# Patient Record
Sex: FEMALE | Race: WHITE | NOT HISPANIC OR LATINO | Employment: OTHER | URBAN - METROPOLITAN AREA
[De-identification: names, ages, dates, MRNs, and addresses within clinical notes are randomized per-mention and may not be internally consistent; named-entity substitution may affect disease eponyms.]

---

## 2017-04-11 ENCOUNTER — OFFICE VISIT (OUTPATIENT)
Dept: FAMILY MEDICINE | Facility: CLINIC | Age: 67
End: 2017-04-11

## 2017-04-11 VITALS
WEIGHT: 173.4 LBS | SYSTOLIC BLOOD PRESSURE: 125 MMHG | OXYGEN SATURATION: 97 % | TEMPERATURE: 98.9 F | HEART RATE: 64 BPM | DIASTOLIC BLOOD PRESSURE: 81 MMHG | RESPIRATION RATE: 20 BRPM | BODY MASS INDEX: 29.53 KG/M2

## 2017-04-11 DIAGNOSIS — M25.551 HIP PAIN, RIGHT: Primary | ICD-10-CM

## 2017-04-11 DIAGNOSIS — B35.3 TINEA PEDIS OF BOTH FEET: ICD-10-CM

## 2017-04-11 RX ORDER — CLOTRIMAZOLE AND BETAMETHASONE DIPROPIONATE 10; .64 MG/G; MG/G
CREAM TOPICAL 2 TIMES DAILY
Qty: 15 G | Refills: 1 | Status: SHIPPED | OUTPATIENT
Start: 2017-04-11 | End: 2017-06-26

## 2017-04-11 ASSESSMENT — ENCOUNTER SYMPTOMS
SLEEP DISTURBANCE: 1
NERVOUS/ANXIOUS: 0
FEVER: 0
CHILLS: 0
SHORTNESS OF BREATH: 0
BACK PAIN: 1
DYSPHORIC MOOD: 0
ARTHRALGIAS: 1
WEAKNESS: 1
HEADACHES: 0

## 2017-04-11 NOTE — PATIENT INSTRUCTIONS
Here is the plan from today's visit    1. Hip pain, right  - X-ray Pelvis w/ unilateral hip right; Future  - [facility], PT, HAND AND CHIROPRACTIC REFERRAL - [facility]    2. Tinea pedis of both feet  - clotrimazole-betamethasone (LOTRISONE) cream; Apply topically 2 times daily  Dispense: 15 g; Refill: 1    Please call or return to clinic if your symptoms don't go away.    Follow up plan  Follow up if you are not improving in the next 2  weeks.    Thank you for coming to [facility] today.  Lab Testing:  **If you had lab testing today and your results are reassuring or normal they will be mailed to you or sent through [employer] within 7 days.   **If the lab tests need quick action we will call you with the results.  The phone number we will call with results is # [phone] (home) . If this is not the best number please call our clinic and change the number.  Medication Refills:  If you need any refills please call your pharmacy and they will contact us.   If you need to  your refill at a new pharmacy, please contact the new pharmacy directly. The new pharmacy will help you get your medications transferred faster.   Scheduling:  If you have any concerns about today's visit or wish to schedule another appointment please call our office during normal business hours [phone] (8-5:00 M-F)  If a referral was made to a [facility] and you don't get a call from central scheduling please call [phone].  If a Mammogram was ordered for you at [facility] call [phone] to schedule or change your appointment.  If you had an XRay/CT/Ultrasound/MRI ordered the number is [phone] to schedule or change your radiology appointment.   Medical Concerns:  If you have urgent medical concerns please call [phone] at any time of the day.

## 2017-04-11 NOTE — PROGRESS NOTES
HPI:       Kimberly Laguna is a 66 year old who presents for the following  Patient presents with:  Musculoskeletal Problem: x2 wks, right  Fungal Infection: bilateral    Joint Pain     Onset: 2 weeks ago started with cold symptoms and coughing    Injury  no    Description:   Location(s): posterior R hip pain  Character: Stabbing and Burning    Intensity: 8/10    Progression of Symptoms: same    Accompanying Signs & Symptoms:  Other symptoms: weakness of hip   History:   Previous similar pain: Yes Details: scitatic pain in the past but different    Worsened by    Overuse?: Yes worse with walking  Morning Stiffness?:Yes     Alleviating factors:  Improved by: naproxen, oxycodone, sitting       Therapies Tried and outcome: naproxen, biking Details: helping  -Improved with sitting and worse with sleeping, worse with walking too, smokes marijuana during the day with improvement in pain  -Rash on bilateral feet, worse on left bottom of foot, has previously tried miconazole and hydrocortisone with no improvement    Problem, Medication and Allergy Lists were reviewed and are  current.  Patient is an established patient of this clinic.         Review of Systems:   Review of Systems   Constitutional: Negative for chills and fever.   Respiratory: Negative for shortness of breath.    Cardiovascular: Negative for chest pain.   Musculoskeletal: Positive for arthralgias (L hip pain), back pain and gait problem.   Neurological: Positive for weakness (weakness while walking). Negative for headaches.   Psychiatric/Behavioral: Positive for sleep disturbance. Negative for dysphoric mood. The patient is not nervous/anxious.              Physical Exam:   Patient Vitals for the past 24 hrs:   BP Temp Temp src Pulse Resp SpO2 Weight   04/11/17 1502 125/81 - - 64 - - -   04/11/17 1456 144/85 98.9  F (37.2  C) Oral 90 20 97 % 173 lb 6.4 oz (78.7 kg)     Body mass index is 29.53 kg/(m^2).  Vitals were reviewed and were normal with  repeat BP     Physical Exam   Constitutional: She appears well-developed and well-nourished. No distress.   HENT:   Head: Normocephalic.   Eyes: Conjunctivae are normal. No scleral icterus.   Cardiovascular: Normal rate, regular rhythm and normal heart sounds.    Pulmonary/Chest: Effort normal and breath sounds normal. She has no wheezes.   Abdominal: Soft. Bowel sounds are normal. She exhibits no distension. There is no tenderness.   Musculoskeletal: Normal range of motion. She exhibits no edema or tenderness.        Cervical back: She exhibits no tenderness and no bony tenderness.        Thoracic back: She exhibits no tenderness and no bony tenderness.        Lumbar back: She exhibits no tenderness, no bony tenderness and no pain.        Back:    Negative FABERE, FAIR (piriformis test) and straight leg raise test, stability with bilateral single leg squat    Neurological: She has normal strength and normal reflexes. No sensory deficit. Gait normal.   Skin:   Bilateral feet with erythematous scaly rash between toes and bottom of left foot, no edema   Psychiatric: Her mood appears anxious. Her speech is rapid and/or pressured. She is hyperactive.         Results:   XR Pelvis w/ unilateral right hip:  IMPRESSION:  1. Joint space narrowing in both hip joints, otherwise no acute bone  abnormality noted.  2. Degenerative disc disease in the visualized lower lumbar spine.    Assessment and Plan     Kimberly was seen today for musculoskeletal problem, fungal infection and depression.    Diagnoses and all orders for this visit:    Hip pain, right  Differential diagnosis includes Muscle strain vs. Piriformis syndrome vs. Osteoarthritis vs. Psychiatric. Physical exam is reassuring with normal strength and reflexes. No tenderness to palpation. X-ray shows mild arthritis with joint space narrowing in both hips. Trial of physical therapy, apply heat packs and may take tylenol or ibuprofen for pain relief. Follow up if symptoms do  not improve.   -     X-ray Pelvis w/ unilateral hip right; Future  -     [facility], PT, HAND AND CHIROPRACTIC REFERRAL - [facility]    Tinea pedis of both feet  Try clotrimazole cream BID for 2 weeks. Follow up if no improvement.   -     clotrimazole (LOTRIMIN) 1 % cream; Apply topically 2 times daily      There are no discontinued medications.  Options for treatment and follow-up care were reviewed with the patient. Kimberly Laguna  engaged in the decision making process and verbalized understanding of the options discussed and agreed with the final plan.    Kristi Osuna MD

## 2017-04-11 NOTE — MR AVS SNAPSHOT
After Visit Summary   4/11/2017    Kimberly Laguna    MRN: [id number]           Patient Information     Date Of Birth          1950        Visit Information        Provider Department      4/11/2017 2:40 PM Kristi Osuna MD [facility]        Today's Diagnoses     Hip pain, right    -  1    Tinea pedis of both feet          Care Instructions    Here is the plan from today's visit    1. Hip pain, right  - X-ray Pelvis w/ unilateral hip right; Future  - [facility], PT, HAND AND CHIROPRACTIC REFERRAL - [facility]    2. Tinea pedis of both feet  - clotrimazole-betamethasone (LOTRISONE) cream; Apply topically 2 times daily  Dispense: 15 g; Refill: 1    Please call or return to clinic if your symptoms don't go away.    Follow up plan  Follow up if you are not improving in the next 2  weeks.    Thank you for coming to [facility] today.  Lab Testing:  **If you had lab testing today and your results are reassuring or normal they will be mailed to you or sent through [employer] within 7 days.   **If the lab tests need quick action we will call you with the results.  The phone number we will call with results is # [phone] (home) . If this is not the best number please call our clinic and change the number.  Medication Refills:  If you need any refills please call your pharmacy and they will contact us.   If you need to  your refill at a new pharmacy, please contact the new pharmacy directly. The new pharmacy will help you get your medications transferred faster.   Scheduling:  If you have any concerns about today's visit or wish to schedule another appointment please call our office during normal business hours [phone] (8-5:00 M-F)  If a referral was made to a [facility] and you don't get a call from central scheduling please call [phone].  If a Mammogram was ordered for you at [facility] call [phone] to schedule or change your  appointment.  If you had an XRay/CT/Ultrasound/MRI ordered the number is [phone] to schedule or change your radiology appointment.   Medical Concerns:  If you have urgent medical concerns please call [phone] at any time of the day.          Follow-ups after your visit        Additional Services     [facility], PT, HAND AND CHIROPRACTIC REFERRAL - [facility]       **This order will print in the [facility] Scheduling Office**    Physical Therapy, Hand Therapy and Chiropractic Care are available through:    *[facility]  *[facility]  *[facility]    Call one number to schedule at any of the above locations: [phone].    Your provider has referred you to: Physical Therapy at [facility] or [facility]    Indication/Reason for Referral: right hip pain  Onset of Illness: 2 weeks ago  Therapy Orders: Evaluate and Treat  Special Programs: None  Special Request: None    Aundrea Guillaume      Additional Comments for the Therapist or Chiropractor:       Please be aware that coverage of these services is subject to the terms and limitations of your health insurance plan.  Call member services at your health plan with any benefit or coverage questions.      Please bring the following to your appointment:    *Your personal calendar for scheduling future appointments  *Comfortable clothing                  Who to contact     Please call your clinic at [phone] to:    Ask questions about your health    Make or cancel appointments    Discuss your medicines    Learn about your test results    Speak to your doctor   If you have compliments or concerns about an experience at your clinic, or if you wish to file a complaint, please contact [facility] Patient Relations at [phone] or email us at [email or web address]         Additional Information About Your Visit        [employer] Information     [employer] is an electronic gateway that provides easy, online access to  your medical records. With [employer], you can request a clinic appointment, read your test results, renew a prescription or communicate with your care team.     To sign up for [employer] visit the website at [email or web address]   You will be asked to enter the access code listed below, as well as some personal information. Please follow the directions to create your username and password.     Your access code is: [email or web address]  Expires: 7/10/2017  4:37 PM     Your access code will  in 90 days. If you need help or a new code, please contact your [facility] or call [phone] for assistance.        Care EveryWhere ID     This is your Care EveryWhere ID. This could be used by other organizations to access your [facility] medical records  [id number]        Your Vitals Were     Pulse Temperature Respirations Pulse Oximetry BMI (Body Mass Index)       64 98.9  F (37.2  C) (Oral) 20 97% 29.53 kg/m2        Blood Pressure from Last 3 Encounters:   17 125/81   16 121/80   08/10/15 139/72    Weight from Last 3 Encounters:   17 173 lb 6.4 oz (78.7 kg)   16 169 lb 3.2 oz (76.7 kg)   08/10/15 163 lb (73.9 kg)              We Performed the Following     [facility], PT, HAND AND CHIROPRACTIC REFERRAL - [facility]          Today's Medication Changes          These changes are accurate as of: 17  4:37 PM.  If you have any questions, ask your nurse or doctor.               Start taking these medicines.        Dose/Directions    clotrimazole-betamethasone cream   Commonly known as:  LOTRISONE   Used for:  Tinea pedis of both feet   Started by:  Kristi Osuna MD        Apply topically 2 times daily   Quantity:  15 g   Refills:  1            Where to get your medicines      These medications were sent to [facility] [address] [place], [place] - [address] [id number]     Phone:  [phone]     clotrimazole-betamethasone cream                 Primary Care Provider Office Phone # Fax #    Gautam Malcolm [phone]       [facility] [address] [facility] [address]        Thank you!     Thank you for choosing [facility]  for your care. Our goal is always to provide you with excellent care. Hearing back from our patients is one way we can continue to improve our services. Please take a few minutes to complete the written survey that you may receive in the mail after your visit with us. Thank you!             Your Updated Medication List - Protect others around you: Learn how to safely use, store and throw away your medicines at www.disposemymeds.org.          This list is accurate as of: 4/11/17  4:37 PM.  Always use your most recent med list.                   Brand Name Dispense Instructions for use    acyclovir 400 MG tablet    ZOVIRAX    15 tablet    Take 1 tablet (400 mg) by mouth 3 times daily       clotrimazole-betamethasone cream    LOTRISONE    15 g    Apply topically 2 times daily       fexofenadine 180 MG tablet    ALLEGRA ALLERGY    30 tablet    Take 1 tablet (180 mg) by mouth daily       fluocinonide 0.05 % ointment    LIDEX    60 g    Apply to hands twice a day for 14 days at a time       guaiFENesin-dextromethorphan 100-10 MG/5ML syrup    ROBITUSSIN DM    560 mL    Take 5 mLs by mouth every 4 hours as needed for cough       hydrocortisone 2.5 % ointment     30 g    Apply  topically 2 times daily.       hydrOXYzine 25 MG tablet    ATARAX    30 tablet    Take 1-2 tablets by mouth nightly as needed for itching.       oxyCODONE 10 MG IR tablet    ROXICODONE     Take by mouth every 4 hours as needed       SEROQUEL 25 MG tablet   Generic drug:  QUEtiapine      Take 50 mg by mouth At Bedtime.       triamcinolone 0.1 % ointment    KENALOG    120 g    Apply topically 2 times daily Apply this to arms but not on hands.

## 2017-04-12 ENCOUNTER — THERAPY VISIT (OUTPATIENT)
Dept: PHYSICAL THERAPY | Facility: CLINIC | Age: 67
End: 2017-04-12
Payer: MEDICARE

## 2017-04-12 ENCOUNTER — TELEPHONE (OUTPATIENT)
Dept: FAMILY MEDICINE | Facility: CLINIC | Age: 67
End: 2017-04-12

## 2017-04-12 DIAGNOSIS — M25.551 HIP PAIN, RIGHT: ICD-10-CM

## 2017-04-12 DIAGNOSIS — M54.16 LUMBAR RADICULOPATHY: ICD-10-CM

## 2017-04-12 PROCEDURE — 97161 PT EVAL LOW COMPLEX 20 MIN: CPT | Mod: GP | Performed by: PHYSICAL THERAPIST

## 2017-04-12 PROCEDURE — 97110 THERAPEUTIC EXERCISES: CPT | Mod: GP | Performed by: PHYSICAL THERAPIST

## 2017-04-12 PROCEDURE — G8979 MOBILITY GOAL STATUS: HCPCS | Mod: GP | Performed by: PHYSICAL THERAPIST

## 2017-04-12 PROCEDURE — G8978 MOBILITY CURRENT STATUS: HCPCS | Mod: GP | Performed by: PHYSICAL THERAPIST

## 2017-04-12 RX ORDER — CLOTRIMAZOLE 1 %
CREAM (GRAM) TOPICAL 2 TIMES DAILY
Qty: 15 G | Refills: 1 | Status: SHIPPED | OUTPATIENT
Start: 2017-04-12 | End: 2017-06-26

## 2017-04-12 NOTE — TELEPHONE ENCOUNTER
[facility] phone call message- medication clarification/question:    Full Medication Name: clotrimazole-betamethasone (LOTRISONE) cream 15 g   Dose: Apply topically 2 times daily    Question: Rakesh calling from [facility] pharmacy.  He states patients insurance does not cover this drug, it is un-formulary.  He states if the two medications are prescribed separately they would be covered.  Does the doctor want to send a script for each medication separately, one for clotrimazole and one for betamethasone.  Please call to advise.         Pharmacy confirmed as      [facility] [address] [place] [address]: Yes    OK to leave a message on voice mail? Yes    Primary language: English      needed? No    Call taken on April 12, 2017 at 9:48 AM by Iwona Lowery

## 2017-04-12 NOTE — MR AVS SNAPSHOT
After Visit Summary   4/12/2017    Kimberly Laguna    MRN: [id number]           Patient Information     Date Of Birth          1950        Visit Information        Provider Department      4/12/2017 4:40 PM Yulia Oseguera, PT [facility]        Today's Diagnoses     Hip pain, right        Lumbar radiculopathy           Follow-ups after your visit        Your next 10 appointments already scheduled     Apr 14, 2017  2:00 PM CDT   TATY Extremity with Jamshid Langford PT   [facility] ([facility]  )    [address] [place] [id number]   [phone]            Apr 26, 2017  3:20 PM CDT   TATY Extremity with Yulia Oseguera PT   [facility] ([facility]  )    [address] [place] [id number]   [phone]            Apr 28, 2017  4:10 PM CDT   TATY Extremity with Yulia Oseguera PT   [facility] ([facility]  )    [address] [place] [id number]   [phone]              Who to contact     If you have questions or need follow up information about today's clinic visit or your schedule please contact [facility] directly at [phone].  Normal or non-critical lab and imaging results will be communicated to you by MyChart, letter or phone within 4 business days after the clinic has received the results. If you do not hear from us within 7 days, please contact the clinic through MyChart or phone. If you have a critical or abnormal lab result, we will notify you by phone as soon as possible.  Submit refill requests through [employer] or call your pharmacy and they will forward the refill request to us. Please allow 3 business days for your refill to be completed.          Additional Information About Your Visit    "     MyChart Information     [employer] lets you send messages to your doctor, view your test results, renew your prescriptions, schedule appointments and more. To sign up, go to [email or web address] . Click on \"Log in\" on the left side of the screen, which will take you to the Welcome page. Then click on \"Sign up Now\" on the right side of the page.     You will be asked to enter the access code listed below, as well as some personal information. Please follow the directions to create your username and password.     Your access code is: [email or web address]  Expires: 7/10/2017  4:37 PM     Your access code will  in 90 days. If you need help or a new code, please call your [facility] clinic or [phone].        Care EveryWhere ID     This is your Care EveryWhere ID. This could be used by other organizations to access your [facility] medical records  [id number]         Blood Pressure from Last 3 Encounters:   17 125/81   16 121/80   08/10/15 139/72    Weight from Last 3 Encounters:   17 78.7 kg (173 lb 6.4 oz)   16 76.7 kg (169 lb 3.2 oz)   08/10/15 73.9 kg (163 lb)              We Performed the Following     HC PT EVAL, LOW COMPLEXITY     TATY CERT REPORT     THERAPEUTIC EXERCISES        Primary Care Provider Office Phone # Fax #    Gautam Malcolm,  [phone]       [facility] [address] [facility] [address]        Thank you!     Thank you for choosing [facility]  for your care. Our goal is always to provide you with excellent care. Hearing back from our patients is one way we can continue to improve our services. Please take a few minutes to complete the written survey that you may receive in the mail after your visit with us. Thank you!             Your Updated Medication List - Protect others around you: Learn how to safely use, store and throw away your medicines at www.disposemymeds.org.    "       This list is accurate as of: 4/12/17 11:59 PM.  Always use your most recent med list.                   Brand Name Dispense Instructions for use    acyclovir 400 MG tablet    ZOVIRAX    15 tablet    Take 1 tablet (400 mg) by mouth 3 times daily       clotrimazole 1 % cream    LOTRIMIN    15 g    Apply topically 2 times daily       clotrimazole-betamethasone cream    LOTRISONE    15 g    Apply topically 2 times daily       fexofenadine 180 MG tablet    ALLEGRA ALLERGY    30 tablet    Take 1 tablet (180 mg) by mouth daily       fluocinonide 0.05 % ointment    LIDEX    60 g    Apply to hands twice a day for 14 days at a time       guaiFENesin-dextromethorphan 100-10 MG/5ML syrup    ROBITUSSIN DM    560 mL    Take 5 mLs by mouth every 4 hours as needed for cough       hydrocortisone 2.5 % ointment     30 g    Apply  topically 2 times daily.       hydrOXYzine 25 MG tablet    ATARAX    30 tablet    Take 1-2 tablets by mouth nightly as needed for itching.       oxyCODONE 10 MG IR tablet    ROXICODONE     Take by mouth every 4 hours as needed       SEROQUEL 25 MG tablet   Generic drug:  QUEtiapine      Take 50 mg by mouth At Bedtime.       triamcinolone 0.1 % ointment    KENALOG    120 g    Apply topically 2 times daily Apply this to arms but not on hands.

## 2017-04-12 NOTE — LETTER
DEPARTMENT OF HEALTH AND HUMAN SERVICES  CENTERS FOR MEDICARE & MEDICAID SERVICES    PLAN/UPDATED PLAN OF PROGRESS FOR OUTPATIENT REHABILITATION    PATIENTS NAME:  Kimberly Laguna     : 1950    [facility] NUMBER:    [id number]    [facility]    PR[facility] NAME: [facility]AP[facility]    MEDICAL RECORD NUMBER: [id number]     START OF CARE DATE:  SOC Date: 17   TYPE:  PT    PRIMARY/TREATMENT DIAGNOSIS: (Pertinent Medical Diagnosis)     Hip pain, right  Lumbar radiculopathy    VISITS FROM START OF CARE:  Rxs Used: 1     Subjective:  Kimberly Laguna is a 66 year old female with a right hip condition. Condition occurred with: Insidious onset. Condition occurred: for unknown reasons. This is a new condition 17 referral.  Pt thinks her pain began from sitting too much during the winter, gaining weight.  First noticed the pain 2-3 weeks ago when she went from a 1/2 mile walk.  Pt reports hx of sciatica R.  Patient reports pain: posterior. Radiates to: Low back and thigh (also has bruised feeling on lateral thigh). Pain is described as shooting and is constant and reported as 2/10 currently, 8/10 at worst. Associated with: Numbness, tingling and loss of strength (popping/clicking). Pain is the same all the time. Symptoms are exacerbated by standing and walking and relieved by UE support - feels better with cart in store, bike riding. Since onset symptoms have remained unchanged.  No previous treatment. General health as reported by patient is fair. Pertinent medical history includes: borderline personality disorder, hepatitis C, marijuana use, weakness. Medical allergies: none. Other surgeries include: none. Current medications: sleep. Current occupation is retired.  Primary job tasks include: household tasks, gardening.   Barriers include: none Red flags: pain at night/rest.     Objective:  Standing Alignment:    Lumbar:  Lordosis decr  Hip deviations  alignment: B femoral medial rotation.  General Deviations:  Toe out R  Functional/Special Tests:  Forward bend: no change in sxs  Back bend: improved sxs  L trunk rotation: improved sxs  R trunk rotation: increased sxs  L trunk lateral flexion: no change in sxs  R trunk lateral flexion: increased sxs, improved with manual block of lateral translation  L single leg stance: no change ins xs  R single leg stance: increased sxs, Trendelenberg, impaired balance compared to L  DOROTHY and FADIR L negative  DOROTHY R negative  FADIR R:  tightness  otherwise pain free  Prone active knee flexion: B rotates and extends lumbar spine, no increase in sxs  Sensation:  Light touch intact and symmetrical B LEs  Strength:  Hip flexion: R 4/5 limited by pain in R glut, L 5/5  Knee extension: B 5/5 pain free  Dorsiflexion: B 5/5 pain free  Glut med: L 4/5, R 3/5 with pain  Glut max: R 4/5 pain free, rotates in lumbar spine, L 5/5  Hamstrings: B pain in low back and R glut, 4/5 R, 5/5 L  ROM:  B hip and trunk ROM WNL  Palpation:  Not assessed today  Edema:  None noted  Gait:  L trunk lean, B Trendelenberg, B foot abduction    Assessment/Plan:    Patient is a 66 year old female with right side hip complaints.  Pt presents with signs and symptoms consistent with lumbar radiculopathy at this time.  Patient has the following significant findings with corresponding treatment plan.                Diagnosis 1:  R hip pain/lumbar radiculopathy  Pain -  hot/cold therapy, manual therapy, education and home program  Decreased strength - therapeutic exercise, therapeutic activities and home program  Impaired balance - neuro re-education, therapeutic activities and home program  Impaired gait - gait training and home program  Impaired muscle performance - neuro re-education and home program  Decreased function - therapeutic activities and home program  Impaired posture - neuro re-education, therapeutic activities and home program  Therapy Evaluation  Codes:   1) History comprised of:   Personal factors that impact the plan of care:      Age and Overall behavior pattern.    Comorbidity factors that impact the plan of care are:      Mental illness, Pain at night/rest, Weakness and hepatitis c.     Medications impacting care: Sleep.  2) Examination of Body Systems comprised of:   Body structures and functions that impact the plan of care:      Hip and Lumbar spine.   Activity limitations that impact the plan of care are:      Stairs, Standing and Walking.  3) Clinical presentation characteristics are:   Stable/Uncomplicated.  4) Decision-Making    Low complexity using standardized patient assessment instrument and/or measureable assessment of functional outcome.  Cumulative Therapy Evaluation is: Low complexity.  Previous and current functional limitations:  (See Goal Flow Sheet for this information)    Short term and Long term goals: (See Goal Flow Sheet for this information)   Communication ability:  Patient appears to be able to clearly communicate and understand verbal and written communication and follow directions correctly.  Treatment Explanation - The following has been discussed with the patient:   RX ordered/plan of care  Anticipated outcomes  Possible risks and side effects  This patient would benefit from PT intervention to resume normal activities.   Rehab potential is good.  Frequency:  1 X week, once daily  Duration:  for 6 weeks  Discharge Plan:  Achieve all LTG.  Independent in home treatment program.  Reach maximal therapeutic benefit.        Caregiver Signature/Credentials _____________________________ Date ________       Treating Provider: Yulia Oseguera, PT, DPT   I have reviewed and certified the need for these services and plan of treatment while under my care.        PHYSICIAN'S SIGNATURE:   _________________________________________  Date___________   Tiffany Fuentes*    Certification period:  Beginning of Cert date period: 04/12/17 to   "End of Cert period date: 07/10/17     Functional Level Progress Report: Please see attached \"Goal Flow sheet for Functional level.\"    ____X____ Continue Services or       ________ DC Services                Service dates: From  SOC Date: 04/12/17 date to present                         "

## 2017-04-12 NOTE — PROGRESS NOTES
Subjective:    Patient is a 66 year old female presenting with rehab composite hpi and rehab right hip hpi.   Additional Answers from Therapist interview  Kimberly Laguna is a 66 year old female with a right hip condition.  Condition occurred with:  Insidious onset.     Pt thinks her pain began from sitting too much during the winter, gaining weight.  First noticed the pain when she went from a 1/2 mile walk.  Got a cold, then noticed that the pain remained. .  Patient reports pain:  Posterior.  Radiates to:  Low back and thigh (also has bruised feeling on lateral thigh).  Associated symptoms:  Numbness, tingling and loss of strength (popping/clicking).  Symptoms are exacerbated by standing and walking  Relieved by: UE support - feel better with cart in store, bike riding.                        Objective:    Standing Alignment:        Lumbar:  Lordosis decr    Hip deviations alignment: B femoral medial rotation.      General Deviations:  Toe out R            Physical Exam     Functional Tests:  Forward bend: no change  Back bend: improved sxs  L trunk rotation: improved sxs  R trunk rotation: increased sxs  L trunk lateral flexion: ***  R trunk lateral flexion: increased sxs, improved with manual blocks  L single leg stance: no change  R single leg stance: increased sxs, Trendelenberg, impaired balance compared to L  Partial squat: ***   FDIR R tightness otherwise painfree good ROM    Sensation:  Light touch intact and symmetrical B LEs    Strength:  ***   Hip flexion: R 4/5 limited by pain in R glut  Knee extension: B 5/5 pain free  Dorsiflexion: B 5/5 pain free  Glut med: L 4/5  Glut max: R 4/5 pain free, rotates in lumbar spine  Hamstrings: B pain in low back and R glut, 4/5 R, 5/5 L, lubmar ext  And rotation    Shoulder flexion:  Shoulder abduction:  Shoulder IR:  Shoulder ER:  Elbow flexion:  :    ROM:  Motion L R Comments                                               Relative  Flexibility:  ***    Palpation:  ***    Edema:  ***    Gait:  ***        General     ROS    Assessment/Plan:      Patient is a 66 year old female with right side hip complaints.  Pt presents with signs and symptoms consistent with lumbar radiculopathy at this time.  Patient has the following significant findings with corresponding treatment plan.                Diagnosis 1:  R hip pain/lumbar radiculopathy  Pain -  hot/cold therapy, manual therapy, education and home program  Decreased strength - therapeutic exercise, therapeutic activities and home program  Impaired balance - neuro re-education, therapeutic activities and home program  Impaired gait - gait training and home program  Impaired muscle performance - neuro re-education and home program  Decreased function - therapeutic activities and home program  Impaired posture - neuro re-education, therapeutic activities and home program    Therapy Evaluation Codes:   1) History comprised of:   Personal factors that impact the plan of care:      Age and Overall behavior pattern.    Comorbidity factors that impact the plan of care are:      Mental illness, Pain at night/rest, Weakness and hepatitis c.     Medications impacting care: Sleep.  2) Examination of Body Systems comprised of:   Body structures and functions that impact the plan of care:      Hip and Lumbar spine.   Activity limitations that impact the plan of care are:      Stairs, Standing and Walking.  3) Clinical presentation characteristics are:   Stable/Uncomplicated.  4) Decision-Making    Low complexity using standardized patient assessment instrument and/or measureable assessment of functional outcome.  Cumulative Therapy Evaluation is: Low complexity.    Previous and current functional limitations:  (See Goal Flow Sheet for this information)    Short term and Long term goals: (See Goal Flow Sheet for this information)     Communication ability:  Patient appears to be able to clearly communicate and  understand verbal and written communication and follow directions correctly.  Treatment Explanation - The following has been discussed with the patient:   RX ordered/plan of care  Anticipated outcomes  Possible risks and side effects  This patient would benefit from PT intervention to resume normal activities.   Rehab potential is good.    Frequency:  1 X week, once daily  Duration:  for 6 weeks  Discharge Plan:  Achieve all LTG.  Independent in home treatment program.  Reach maximal therapeutic benefit.    Please refer to the daily flowsheet for treatment today, total treatment time and time spent performing 1:1 timed codes.

## 2017-04-13 PROBLEM — M25.551 HIP PAIN, RIGHT: Status: ACTIVE | Noted: 2017-04-13

## 2017-04-13 PROBLEM — M54.16 LUMBAR RADICULOPATHY: Status: ACTIVE | Noted: 2017-04-13

## 2017-04-13 ASSESSMENT — ACTIVITIES OF DAILY LIVING (ADL)
LIGHT_TO_MODERATE_WORK: SLIGHT DIFFICULTY
HOS_ADL_ITEM_SCORE_TOTAL: 18
WALKING_DOWN_STEEP_HILLS: UNABLE TO DO
STANDING_FOR_15_MINUTES: UNABLE TO DO
WALKING_UP_STEEP_HILLS: UNABLE TO DO
HOW_WOULD_YOU_RATE_YOUR_CURRENT_LEVEL_OF_FUNCTION_DURING_YOUR_USUAL_ACTIVITIES_OF_DAILY_LIVING_FROM_0_TO_100_WITH_100_BEING_YOUR_LEVEL_OF_FUNCTION_PRIOR_TO_YOUR_HIP_PROBLEM_AND_0_BEING_THE_INABILITY_TO_PERFORM_ANY_OF_YOUR_USUAL_DAILY_ACTIVITIES?: 20
WALKING_INITIALLY: MODERATE DIFFICULTY
STEPPING_UP_AND_DOWN_CURBS: MODERATE DIFFICULTY
ROLLING_OVER_IN_BED: SLIGHT DIFFICULTY
HOS_ADL_HIGHEST_POTENTIAL_SCORE: 60
SITTING_FOR_15_MINUTES: NO DIFFICULTY AT ALL
GETTING_INTO_AND_OUT_OF_A_BATHTUB: EXTREME DIFFICULTY
TWISTING/PIVOTING_ON_INVOLVED_LEG: MODERATE DIFFICULTY
WALKING_15_MINUTES_OR_GREATER: UNABLE TO DO
WALKING_APPROXIMATELY_10_MINUTES: UNABLE TO DO
PUTTING_ON_SOCKS_AND_SHOES: SLIGHT DIFFICULTY
GETTING_INTO_AND_OUT_OF_AN_AVERAGE_CAR: MODERATE DIFFICULTY
HEAVY_WORK: UNABLE TO DO
DEEP_SQUATTING: UNABLE TO DO
HOS_ADL_COUNT: 15
RECREATIONAL_ACTIVITIES: SLIGHT DIFFICULTY
HOS_ADL_SCORE(%): 30

## 2017-04-13 NOTE — PATIENT INSTRUCTIONS
I did not prescribe that medication.  Will defer to Kristi Osuna who prescribed the medication.    Nichole Malcolm, DO

## 2017-04-13 NOTE — PROGRESS NOTES
Subjective:     Kimberly Laguna is a 66 year old female with a right hip condition. Condition occurred with: Insidious onset. Condition occurred: for unknown reasons. This is a new condition 4/11/17 referral.  Pt thinks her pain began from sitting too much during the winter, gaining weight.  First noticed the pain 2-3 weeks ago when she went from a 1/2 mile walk.  Pt reports hx of sciatica R.    Patient reports pain: posterior. Radiates to: Low back and thigh (also has bruised feeling on lateral thigh). Pain is described as shooting and is constant and reported as 2/10 currently, 8/10 at worst. Associated with: Numbness, tingling and loss of strength (popping/clicking). Pain is the same all the time. Symptoms are exacerbated by standing and walking and relieved by UE support - feels better with cart in store, bike riding. Since onset symptoms have remained unchanged.  No previous treatment. General health as reported by patient is fair. Pertinent medical history includes: borderline personality disorder, hepatitis C, marijuana use, weakness. Medical allergies: none. Other surgeries include: none. Current medications: sleep. Current occupation is retired.  Primary job tasks include: household tasks, gardening.     Barriers include: none     Red flags: pain at night/rest.     Objective:    Standing Alignment:    Lumbar:  Lordosis decr  Hip deviations alignment: B femoral medial rotation.  General Deviations:  Toe out R    Functional/Special Tests:  Forward bend: no change in sxs  Back bend: improved sxs  L trunk rotation: improved sxs  R trunk rotation: increased sxs  L trunk lateral flexion: no change in sxs  R trunk lateral flexion: increased sxs, improved with manual block of lateral translation  L single leg stance: no change ins xs  R single leg stance: increased sxs, Trendelenberg, impaired balance compared to L  DOROTHY and FADIR L negative  DOROTHY R negative  FADIR R:  tightness  otherwise pain free  Prone  active knee flexion: B rotates and extends lumbar spine, no increase in sxs    Sensation:  Light touch intact and symmetrical B LEs    Strength:  Hip flexion: R 4/5 limited by pain in R glut, L 5/5  Knee extension: B 5/5 pain free  Dorsiflexion: B 5/5 pain free  Glut med: L 4/5, R 3/5 with pain  Glut max: R 4/5 pain free, rotates in lumbar spine, L 5/5  Hamstrings: B pain in low back and R glut, 4/5 R, 5/5 L    ROM:  B hip and trunk ROM WNL    Palpation:  Not assessed today    Edema:  None noted    Gait:  L trunk lean, B Trendelenberg, B foot abduction    Assessment/Plan:      Patient is a 66 year old female with right side hip complaints.  Pt presents with signs and symptoms consistent with lumbar radiculopathy at this time.  Patient has the following significant findings with corresponding treatment plan.                Diagnosis 1:  R hip pain/lumbar radiculopathy  Pain -  hot/cold therapy, manual therapy, education and home program  Decreased strength - therapeutic exercise, therapeutic activities and home program  Impaired balance - neuro re-education, therapeutic activities and home program  Impaired gait - gait training and home program  Impaired muscle performance - neuro re-education and home program  Decreased function - therapeutic activities and home program  Impaired posture - neuro re-education, therapeutic activities and home program    Therapy Evaluation Codes:   1) History comprised of:   Personal factors that impact the plan of care:      Age and Overall behavior pattern.    Comorbidity factors that impact the plan of care are:      Mental illness, Pain at night/rest, Weakness and hepatitis c.     Medications impacting care: Sleep.  2) Examination of Body Systems comprised of:   Body structures and functions that impact the plan of care:      Hip and Lumbar spine.   Activity limitations that impact the plan of care are:      Stairs, Standing and Walking.  3) Clinical presentation characteristics  are:   Stable/Uncomplicated.  4) Decision-Making    Low complexity using standardized patient assessment instrument and/or measureable assessment of functional outcome.  Cumulative Therapy Evaluation is: Low complexity.    Previous and current functional limitations:  (See Goal Flow Sheet for this information)    Short term and Long term goals: (See Goal Flow Sheet for this information)     Communication ability:  Patient appears to be able to clearly communicate and understand verbal and written communication and follow directions correctly.  Treatment Explanation - The following has been discussed with the patient:   RX ordered/plan of care  Anticipated outcomes  Possible risks and side effects  This patient would benefit from PT intervention to resume normal activities.   Rehab potential is good.    Frequency:  1 X week, once daily  Duration:  for 6 weeks  Discharge Plan:  Achieve all LTG.  Independent in home treatment program.  Reach maximal therapeutic benefit.    Please refer to the daily flowsheet for treatment today, total treatment time and time spent performing 1:1 timed codes.

## 2017-04-14 ENCOUNTER — THERAPY VISIT (OUTPATIENT)
Dept: PHYSICAL THERAPY | Facility: CLINIC | Age: 67
End: 2017-04-14
Payer: MEDICARE

## 2017-04-14 DIAGNOSIS — R26.9 ABNORMAL GAIT: Primary | ICD-10-CM

## 2017-04-14 DIAGNOSIS — M54.16 LUMBAR RADICULOPATHY: ICD-10-CM

## 2017-04-14 DIAGNOSIS — M25.551 HIP PAIN, RIGHT: ICD-10-CM

## 2017-04-14 PROCEDURE — 97116 GAIT TRAINING THERAPY: CPT | Mod: GP | Performed by: PHYSICAL THERAPIST

## 2017-04-14 PROCEDURE — 97110 THERAPEUTIC EXERCISES: CPT | Mod: GP | Performed by: PHYSICAL THERAPIST

## 2017-04-14 NOTE — PROGRESS NOTES
Preceptor Attestation:   Patient seen and discussed with the resident. Assessment and plan reviewed with resident and agreed upon.   Supervising Physician:  Tiffany Sheikh MD  [facility]

## 2017-04-26 ENCOUNTER — THERAPY VISIT (OUTPATIENT)
Dept: PHYSICAL THERAPY | Facility: CLINIC | Age: 67
End: 2017-04-26
Payer: MEDICARE

## 2017-04-26 DIAGNOSIS — M25.551 HIP PAIN, RIGHT: ICD-10-CM

## 2017-04-26 DIAGNOSIS — M54.16 LUMBAR RADICULOPATHY: ICD-10-CM

## 2017-04-26 PROCEDURE — 97112 NEUROMUSCULAR REEDUCATION: CPT | Mod: GP | Performed by: PHYSICAL THERAPIST

## 2017-04-26 PROCEDURE — 97110 THERAPEUTIC EXERCISES: CPT | Mod: GP | Performed by: PHYSICAL THERAPIST

## 2017-05-10 ENCOUNTER — THERAPY VISIT (OUTPATIENT)
Dept: PHYSICAL THERAPY | Facility: CLINIC | Age: 67
End: 2017-05-10
Payer: MEDICARE

## 2017-05-10 DIAGNOSIS — M25.551 HIP PAIN, RIGHT: ICD-10-CM

## 2017-05-10 DIAGNOSIS — M54.16 LUMBAR RADICULOPATHY: ICD-10-CM

## 2017-05-10 PROCEDURE — G8979 MOBILITY GOAL STATUS: HCPCS | Mod: GP | Performed by: PHYSICAL THERAPIST

## 2017-05-10 PROCEDURE — 97110 THERAPEUTIC EXERCISES: CPT | Mod: GP | Performed by: PHYSICAL THERAPIST

## 2017-05-10 PROCEDURE — 97112 NEUROMUSCULAR REEDUCATION: CPT | Mod: GP | Performed by: PHYSICAL THERAPIST

## 2017-05-10 PROCEDURE — G8980 MOBILITY D/C STATUS: HCPCS | Mod: GP | Performed by: PHYSICAL THERAPIST

## 2017-05-10 NOTE — MR AVS SNAPSHOT
"              After Visit Summary   5/10/2017    Kimberly Laguna    MRN: [id number]           Patient Information     Date Of Birth          1950        Visit Information        Provider Department      5/10/2017 3:20 PM Yulia Oseguera PT [facility]        Today's Diagnoses     Hip pain, right        Lumbar radiculopathy           Follow-ups after your visit        Who to contact     If you have questions or need follow up information about today's clinic visit or your schedule please contact [facility] PHYSICAL [facility] directly at [phone].  Normal or non-critical lab and imaging results will be communicated to you by [employer], letter or phone within 4 business days after the clinic has received the results. If you do not hear from us within 7 days, please contact the clinic through [employer] or phone. If you have a critical or abnormal lab result, we will notify you by phone as soon as possible.  Submit refill requests through [employer] or call your pharmacy and they will forward the refill request to us. Please allow 3 business days for your refill to be completed.          Additional Information About Your Visit        MyChart Information     [employer] lets you send messages to your doctor, view your test results, renew your prescriptions, schedule appointments and more. To sign up, go to [email or web address] . Click on \"Log in\" on the left side of the screen, which will take you to the Welcome page. Then click on \"Sign up Now\" on the right side of the page.     You will be asked to enter the access code listed below, as well as some personal information. Please follow the directions to create your username and password.     Your access code is: [email or web address]  Expires: 7/10/2017  4:37 PM     Your access code will  in 90 days. If you need help or a new code, please call your [place] clinic or [phone].   "      Care EveryWhere ID     This is your Care EveryWhere ID. This could be used by other organizations to access your [facility] medical records  [id number]         Blood Pressure from Last 3 Encounters:   04/11/17 125/81   09/12/16 121/80   08/10/15 139/72    Weight from Last 3 Encounters:   04/11/17 78.7 kg (173 lb 6.4 oz)   09/12/16 76.7 kg (169 lb 3.2 oz)   08/10/15 73.9 kg (163 lb)              We Performed the Following     [facility] PROGRESS NOTES REPORT     NEUROMUSCULAR RE-EDUCATION     THERAPEUTIC EXERCISES        Primary Care Provider Office Phone # Fax #    Gautam Malcolm,  [phone]       [facility] [address] [facility] [address]        Thank you!     Thank you for choosing [facility] PH[facility]ICAL THERAP[facility]  for your care. Our goal is always to provide you with excellent care. Hearing back from our patients is one way we can continue to improve our services. Please take a few minutes to complete the written survey that you may receive in the mail after your visit with us. Thank you!             Your Updated Medication List - Protect others around you: Learn how to safely use, store and throw away your medicines at www.disposemymeds.org.          This list is accurate as of: 5/10/17 11:59 PM.  Always use your most recent med list.                   Brand Name Dispense Instructions for use    acyclovir 400 MG tablet    ZOVIRAX    15 tablet    Take 1 tablet (400 mg) by mouth 3 times daily       clotrimazole 1 % cream    LOTRIMIN    15 g    Apply topically 2 times daily       clotrimazole-betamethasone cream    LOTRISONE    15 g    Apply topically 2 times daily       fexofenadine 180 MG tablet    ALLEGRA ALLERGY    30 tablet    Take 1 tablet (180 mg) by mouth daily       fluocinonide 0.05 % ointment    LIDEX    60 g    Apply to hands twice a day for 14 days at a time       guaiFENesin-dextromethorphan 100-10 MG/5ML syrup    ROBITUSSIN  DM    560 mL    Take 5 mLs by mouth every 4 hours as needed for cough       hydrocortisone 2.5 % ointment     30 g    Apply  topically 2 times daily.       hydrOXYzine 25 MG tablet    ATARAX    30 tablet    Take 1-2 tablets by mouth nightly as needed for itching.       oxyCODONE 10 MG IR tablet    ROXICODONE     Take by mouth every 4 hours as needed       SEROQUEL 25 MG tablet   Generic drug:  QUEtiapine      Take 50 mg by mouth At Bedtime.       triamcinolone 0.1 % ointment    KENALOG    120 g    Apply topically 2 times daily Apply this to arms but not on hands.

## 2017-05-11 PROBLEM — M25.551 HIP PAIN, RIGHT: Status: RESOLVED | Noted: 2017-04-13 | Resolved: 2017-05-11

## 2017-05-11 PROBLEM — M54.16 LUMBAR RADICULOPATHY: Status: RESOLVED | Noted: 2017-04-13 | Resolved: 2017-05-11

## 2017-05-11 NOTE — PROGRESS NOTES
Subjective:    HPI                    Objective:    System    Physical Exam    General     ROS    Assessment/Plan:      DISCHARGE REPORT    Progress reporting period is from 4/12/17 to 5/10/17.       SUBJECTIVE  Subjective changes noted by patient:  Pt stated she walked a lot more this week, including hills.  Feels that she is 90% recovered.  Thinks she would like today to be her last day of PT.    Current pain level is 0/10.     Previous pain level was 2/10 (8/10 with activity).   Changes in function:  Yes (See Goal flowsheet attached for changes in current functional level)  Adverse reaction to treatment or activity: None    OBJECTIVE  Improved muscle control, improved gait mechanics.     ASSESSMENT/PLAN  STG/LTGs have been met or progress has been made towards goals:  Yes (See Goal flow sheet completed today.)  Assessment of Progress: The patient's condition is improving.  Self Management Plans:  Patient has been instructed in a home treatment program.  Kimberly continues to require the following intervention to meet STG and LTG's:  PT intervention is no longer required to meet STG/LTG.    Recommendations:  This patient is ready to be discharged from therapy and continue their home treatment program.    Please refer to the daily flowsheet for treatment today, total treatment time and time spent performing 1:1 timed codes.

## 2017-06-13 ENCOUNTER — TELEPHONE (OUTPATIENT)
Dept: FAMILY MEDICINE | Facility: CLINIC | Age: 67
End: 2017-06-13

## 2017-06-13 NOTE — TELEPHONE ENCOUNTER
[facility] phone call message- medication clarification/question:    Full Medication Name: triamcinolone (KENALOG) 0.1 % ointment       Question: Patient called wanting to know if she can switch to a 0.5% cream. She doesn't feel like the 0.1% cream is working for her.      Pharmacy confirmed as    [id number] [place], [place] - [address] [place] [address] STREET: Yes    OK to leave a message on voice mail? Yes    Primary language: English      needed? No    Call taken on June 13, 2017 at 10:43 AM by Huma Mckoy

## 2017-06-15 ENCOUNTER — OFFICE VISIT (OUTPATIENT)
Dept: FAMILY MEDICINE | Facility: CLINIC | Age: 67
End: 2017-06-15

## 2017-06-15 VITALS
BODY MASS INDEX: 28.78 KG/M2 | RESPIRATION RATE: 20 BRPM | TEMPERATURE: 98.3 F | OXYGEN SATURATION: 100 % | WEIGHT: 169 LBS | DIASTOLIC BLOOD PRESSURE: 72 MMHG | HEART RATE: 101 BPM | SYSTOLIC BLOOD PRESSURE: 108 MMHG

## 2017-06-15 DIAGNOSIS — R53.83 FATIGUE, UNSPECIFIED TYPE: ICD-10-CM

## 2017-06-15 DIAGNOSIS — B35.3 TINEA PEDIS OF BOTH FEET: Primary | ICD-10-CM

## 2017-06-15 DIAGNOSIS — Z11.59 NEED FOR HEPATITIS C SCREENING TEST: ICD-10-CM

## 2017-06-15 DIAGNOSIS — Z51.81 THERAPEUTIC DRUG MONITORING: ICD-10-CM

## 2017-06-15 DIAGNOSIS — L29.9 ITCHING: ICD-10-CM
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
## 2017-06-15 DIAGNOSIS — L30.9 ECZEMA, UNSPECIFIED TYPE: ICD-10-CM

## 2017-06-15 LAB
% GRANULOCYTES: 62.8 %G (ref 40–75)
ALBUMIN SERPL-MCNC: 4.3 MG/DL (ref 3.5–4.7)
ALP SERPL-CCNC: 69.9 U/L (ref 31.7–110.5)
ALT SERPL-CCNC: 11.7 U/L (ref 0–45)
AST SERPL-CCNC: 17.2 U/L (ref 0–45)
BILIRUB SERPL-MCNC: <0.4 MG/DL (ref 0.2–1.3)
BUN SERPL-MCNC: 13.9 MG/DL (ref 7–19)
CALCIUM SERPL-MCNC: 10.8 MG/DL (ref 8.5–10.1)
CHLORIDE SERPLBLD-SCNC: 104 MMOL/L (ref 98–110)
CO2 SERPL-SCNC: 23.9 MMOL/L (ref 20–32)
CREAT SERPL-MCNC: 0.8 MG/DL (ref 0.5–1)
GFR SERPL CREATININE-BSD FRML MDRD: 76.5 ML/MIN/1.7 M2
GLUCOSE SERPL-MCNC: 118.7 MG'DL (ref 70–99)
GRANULOCYTES #: 5.1 K/UL (ref 1.6–8.3)
HBA1C MFR BLD: 5.7 % (ref 4.1–5.7)
HCT VFR BLD AUTO: 39.5 % (ref 35–47)
HEMOGLOBIN: 12.8 G/DL (ref 11.7–15.7)
KOH PREP: NEGATIVE
LYMPHOCYTES # BLD AUTO: 2.2 K/UL (ref 0.8–5.3)
LYMPHOCYTES NFR BLD AUTO: 27.6 %L (ref 20–48)
MCH RBC QN AUTO: 29.8 PG (ref 26.5–35)
MCHC RBC AUTO-ENTMCNC: 32.4 G/DL (ref 32–36)
MCV RBC AUTO: 92 FL (ref 78–100)
MID #: 0.8 K/UL (ref 0–2.2)
MID %: 9.6 %M (ref 0–20)
PLATELET # BLD AUTO: 328 K/UL (ref 150–450)
POTASSIUM SERPL-SCNC: 3.7 MMOL/DL (ref 3.3–4.5)
PROT SERPL-MCNC: 7.6 G/DL (ref 6.8–8.8)
RBC # BLD AUTO: 4.29 M/UL (ref 3.8–5.2)
SODIUM SERPL-SCNC: 140.5 MMOL/L (ref 132.6–141.4)
SPECIMEN DESCRIPTION: NORMAL
TSH SERPL DL<=0.005 MIU/L-ACNC: 1.52 MU/L (ref 0.4–4)
WBC # BLD AUTO: 8.1 K/UL (ref 4–11)

## 2017-06-15 RX ORDER — TRIAMCINOLONE ACETONIDE 1 MG/G
OINTMENT TOPICAL 2 TIMES DAILY
Qty: 120 G | Refills: 2 | Status: SHIPPED | OUTPATIENT
Start: 2017-06-15 | End: 2017-07-12

## 2017-06-15 RX ORDER — HYDROXYZINE HYDROCHLORIDE 25 MG/1
25-50 TABLET, FILM COATED ORAL
Qty: 30 TABLET | Refills: 0 | Status: SHIPPED | OUTPATIENT
Start: 2017-06-15 | End: 2017-07-10

## 2017-06-15 RX ORDER — TERBINAFINE HYDROCHLORIDE 250 MG/1
250 TABLET ORAL DAILY
Qty: 14 TABLET | Refills: 0 | Status: SHIPPED | OUTPATIENT
Start: 2017-06-15 | End: 2017-06-29

## 2017-06-15 NOTE — MR AVS SNAPSHOT
After Visit Summary   6/15/2017    Kimberly Laguna    MRN: [id number]           Patient Information     Date Of Birth          1950        Visit Information        Provider Department      6/15/2017 2:20 PM Charmaine Ewing MD [facility]        Today's Diagnoses     Tinea pedis of both feet    -  1    Therapeutic drug monitoring        Fatigue, unspecified type        Bed bug bite, initial encounter        Need for hepatitis C screening test        Eczema, unspecified type        Itching          Care Instructions    Here is the plan from today's visit    1. Therapeutic drug monitoring  - Comprehensive Metabolic Panel ([facility]); Future  - Comprehensive Metabolic Panel ([facility])    2. Fatigue, unspecified type  - CBC with Diff Plt ([facility])  - Hemoglobin A1c ([facility])  - TSH with free T4 reflex    3. Bed bug bite, initial encounter  - the only way to get rid of these to to exterminate the bed bugs.     4. Tinea pedis of both feet  - KOH Prep ([facility])  - terbinafine (LAMISIL) 250 MG tablet; Take 1 tablet (250 mg) by mouth daily for 14 days  Dispense: 14 tablet; Refill: 0    5. Need for hepatitis C screening test  - Hepatitis C antibody    6. Eczema, unspecified type  - triamcinolone (KENALOG) 0.1 % ointment; Apply topically 2 times daily Apply this to arms but not on hands.  Dispense: 120 g; Refill: 2    7. Itching  - hydrOXYzine (ATARAX) 25 MG tablet; Take 1-2 tablets (25-50 mg) by mouth nightly as needed for itching  Dispense: 30 tablet; Refill: 0      Please call or return to clinic if your symptoms don't go away.    Follow up plan  Please make a clinic appointment for follow up with your primary physician Gautam Malcolm in 2 weeks for follow up.    Thank you for coming to [facility] today.  Lab Testing:  **If you had lab testing today and your results are reassuring or normal they will be mailed to you or sent through [employer] within 7 days.   **If  the lab tests need quick action we will call you with the results.  The phone number we will call with results is # [phone] (home) . If this is not the best number please call our clinic and change the number.  Medication Refills:  If you need any refills please call your pharmacy and they will contact us.   If you need to  your refill at a new pharmacy, please contact the new pharmacy directly. The new pharmacy will help you get your medications transferred faster.   Scheduling:  If you have any concerns about today's visit or wish to schedule another appointment please call our office during normal business hours [phone] (8-5:00 M-F)  If a referral was made to a [facility] and you don't get a call from central scheduling please call [phone].  If a Mammogram was ordered for you at [facility] call [phone] to schedule or change your appointment.  If you had an XRay/CT/Ultrasound/MRI ordered the number is [phone] to schedule or change your radiology appointment.   Medical Concerns:  If you have urgent medical concerns please call [phone] at any time of the day.  If you have a medical emergency please call [phone].            Follow-ups after your visit        Who to contact     Please call your clinic at [phone] to:    Ask questions about your health    Make or cancel appointments    Discuss your medicines    Learn about your test results    Speak to your doctor   If you have compliments or concerns about an experience at your clinic, or if you wish to file a complaint, please contact [facility] Patient Relations at [phone] or email us at [email or web address]         Additional Information About Your Visit        [employer] Information     [employer] is an electronic gateway that provides easy, online access to your medical records. With [employer], you can request a clinic appointment, read your test results, renew  a prescription or communicate with your care team.     To sign up for [employer] visit the website at [email or web address]   You will be asked to enter the access code listed below, as well as some personal information. Please follow the directions to create your username and password.     Your access code is: [email or web address]  Expires: 7/10/2017  4:37 PM     Your access code will  in 90 days. If you need help or a new code, please contact your [facility] or call [phone] for assistance.        Care EveryWhere ID     This is your Care EveryWhere ID. This could be used by other organizations to access your [place] medical records  [id number]        Your Vitals Were     Pulse Temperature Respirations Pulse Oximetry Breastfeeding? BMI (Body Mass Index)    101 98.3  F (36.8  C) (Oral) 20 100% No 28.78 kg/m2       Blood Pressure from Last 3 Encounters:   06/15/17 108/72   17 125/81   16 121/80    Weight from Last 3 Encounters:   06/15/17 169 lb (76.7 kg)   17 173 lb 6.4 oz (78.7 kg)   16 169 lb 3.2 oz (76.7 kg)              We Performed the Following     CBC with Diff Plt ([facility])     Comprehensive Metabolic Panel ([facility])     Hemoglobin A1c ([facility])     Hepatitis C antibody     KOH Prep ([facility])     TSH with free T4 reflex          Today's Medication Changes          These changes are accurate as of: 6/15/17  4:49 PM.  If you have any questions, ask your nurse or doctor.               Start taking these medicines.        Dose/Directions    terbinafine 250 MG tablet   Commonly known as:  lamISIL   Used for:  Tinea pedis of both feet   Started by:  Charmaine Ewing MD        Dose:  250 mg   Take 1 tablet (250 mg) by mouth daily for 14 days   Quantity:  14 tablet   Refills:  0            Where to get your medicines      These medications were sent to [facility] [address] [place], [place] - [address]  [address]     Phone:  [phone]     hydrOXYzine 25 MG tablet    terbinafine 250 MG tablet    triamcinolone 0.1 % ointment                Primary Care Provider Office Phone # Fax Agustin Malcolm [phone]       [facility] [address]  [facility] [address]        Thank you!     Thank you for choosing [facility]  for your care. Our goal is always to provide you with excellent care. Hearing back from our patients is one way we can continue to improve our services. Please take a few minutes to complete the written survey that you may receive in the mail after your visit with us. Thank you!             Your Updated Medication List - Protect others around you: Learn how to safely use, store and throw away your medicines at www.disposemymeds.org.          This list is accurate as of: 6/15/17  4:49 PM.  Always use your most recent med list.                   Brand Name Dispense Instructions for use    acyclovir 400 MG tablet    ZOVIRAX    15 tablet    Take 1 tablet (400 mg) by mouth 3 times daily       clotrimazole 1 % cream    LOTRIMIN    15 g    Apply topically 2 times daily       clotrimazole-betamethasone cream    LOTRISONE    15 g    Apply topically 2 times daily       fexofenadine 180 MG tablet    ALLEGRA ALLERGY    30 tablet    Take 1 tablet (180 mg) by mouth daily       fluocinonide 0.05 % ointment    LIDEX    60 g    Apply to hands twice a day for 14 days at a time       guaiFENesin-dextromethorphan 100-10 MG/5ML syrup    ROBITUSSIN DM    560 mL    Take 5 mLs by mouth every 4 hours as needed for cough       hydrocortisone 2.5 % ointment     30 g    Apply  topically 2 times daily.       hydrOXYzine 25 MG tablet    ATARAX    30 tablet    Take 1-2 tablets (25-50 mg) by mouth nightly as needed for itching       oxyCODONE 10 MG IR tablet    ROXICODONE     Take by mouth every 4 hours as needed       SEROQUEL 25 MG tablet   Generic drug:  QUEtiapine       Take 50 mg by mouth At Bedtime.       terbinafine 250 MG tablet    lamISIL    14 tablet    Take 1 tablet (250 mg) by mouth daily for 14 days       triamcinolone 0.1 % ointment    KENALOG    120 g    Apply topically 2 times daily Apply this to arms but not on hands.

## 2017-06-15 NOTE — TELEPHONE ENCOUNTER
Patient needs to schedule an appointment since the infection is not improving and may require a different topical agent. Will route to  to schedule appointment.

## 2017-06-15 NOTE — PATIENT INSTRUCTIONS
Here is the plan from today's visit    1. Therapeutic drug monitoring  - Comprehensive Metabolic Panel ([facility]); Future  - Comprehensive Metabolic Panel ([facility])    2. Fatigue, unspecified type  - CBC with Diff Plt ([facility])  - Hemoglobin A1c ([facility])  - TSH with free T4 reflex    3. Bed bug bite, initial encounter  - the only way to get rid of these to to exterminate the bed bugs.     4. Tinea pedis of both feet  - KOH Prep ([facility])  - terbinafine (LAMISIL) 250 MG tablet; Take 1 tablet (250 mg) by mouth daily for 14 days  Dispense: 14 tablet; Refill: 0    5. Need for hepatitis C screening test  - Hepatitis C antibody    6. Eczema, unspecified type  - triamcinolone (KENALOG) 0.1 % ointment; Apply topically 2 times daily Apply this to arms but not on hands.  Dispense: 120 g; Refill: 2    7. Itching  - hydrOXYzine (ATARAX) 25 MG tablet; Take 1-2 tablets (25-50 mg) by mouth nightly as needed for itching  Dispense: 30 tablet; Refill: 0      Please call or return to clinic if your symptoms don't go away.    Follow up plan  Please make a clinic appointment for follow up with your primary physician Gautam Malcolm in 2 weeks for follow up.    Thank you for coming to [facility] today.  Lab Testing:  **If you had lab testing today and your results are reassuring or normal they will be mailed to you or sent through [employer] within 7 days.   **If the lab tests need quick action we will call you with the results.  The phone number we will call with results is # [phone] (home) . If this is not the best number please call our clinic and change the number.  Medication Refills:  If you need any refills please call your pharmacy and they will contact us.   If you need to  your refill at a new pharmacy, please contact the new pharmacy directly. The new pharmacy will help you get your medications transferred faster.   Scheduling:  If you have any concerns about today's visit or wish to schedule another  appointment please call our office during normal business hours [phone] (8-5:00 M-F)  If a referral was made to a [facility] and you don't get a call from central scheduling please call [phone].  If a Mammogram was ordered for you at [facility] call [phone] to schedule or change your appointment.  If you had an XRay/CT/Ultrasound/MRI ordered the number is [phone] to schedule or change your radiology appointment.   Medical Concerns:  If you have urgent medical concerns please call [phone] at any time of the day.  If you have a medical emergency please call [phone].
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
## 2017-06-16 LAB — HCV AB SERPL QL IA: ABNORMAL

## 2017-06-16 NOTE — PROGRESS NOTES
Preceptor Attestation:   Patient seen and discussed with the resident. Assessment and plan reviewed with resident and agreed upon.   Supervising Physician:  Luz Maria Edgar MD  [facility]

## 2017-06-20 ASSESSMENT — ENCOUNTER SYMPTOMS
MYALGIAS: 0
FEVER: 0
ABDOMINAL PAIN: 0
SORE THROAT: 0
CHILLS: 0
SHORTNESS OF BREATH: 0
DYSURIA: 0
WHEEZING: 0
HEMATURIA: 0
NAUSEA: 0
DIZZINESS: 0
PALPITATIONS: 0
ARTHRALGIAS: 0
HEADACHES: 0
FATIGUE: 0

## 2017-06-21 ENCOUNTER — TELEPHONE (OUTPATIENT)
Dept: FAMILY MEDICINE | Facility: CLINIC | Age: 67
End: 2017-06-21

## 2017-06-21 NOTE — TELEPHONE ENCOUNTER
"[facility] phone call message - order or referral request for patient:     Order or referral being requested: walking cane      Additional Comments: patient states that her \"right hip is extremely painful and her left foot is full of blisters, due to out of control athletes foot\" patient is requesting a cane from [facility] [phone]. [address]. Forwarded to triage as per protocol.    OK to leave a message on voice mail? Yes    Primary language: English      needed? No    Call taken on June 21, 2017 at 1:43 PM by Coty Gao    "

## 2017-06-21 NOTE — TELEPHONE ENCOUNTER
Message routed to PCP to place order if appropriate or advise if patient requires appointment. If appointment is needed, please route to [facility] .    Vivian Skaggs RN

## 2017-06-22 ENCOUNTER — OFFICE VISIT (OUTPATIENT)
Dept: FAMILY MEDICINE | Facility: CLINIC | Age: 67
End: 2017-06-22

## 2017-06-22 VITALS
DIASTOLIC BLOOD PRESSURE: 75 MMHG | SYSTOLIC BLOOD PRESSURE: 107 MMHG | HEART RATE: 89 BPM | WEIGHT: 167.6 LBS | BODY MASS INDEX: 28.55 KG/M2 | RESPIRATION RATE: 16 BRPM

## 2017-06-22 DIAGNOSIS — M25.551 HIP PAIN, BILATERAL: ICD-10-CM

## 2017-06-22 DIAGNOSIS — R26.89 FUNCTIONAL GAIT ABNORMALITY: ICD-10-CM

## 2017-06-22 DIAGNOSIS — R76.8 HEPATITIS C ANTIBODY TEST POSITIVE: Primary | ICD-10-CM

## 2017-06-22 DIAGNOSIS — M25.552 HIP PAIN, BILATERAL: ICD-10-CM

## 2017-06-22 DIAGNOSIS — R21 RASH AND NONSPECIFIC SKIN ERUPTION: Primary | ICD-10-CM

## 2017-06-22 RX ORDER — PREDNISONE 20 MG/1
40 TABLET ORAL DAILY
Qty: 10 TABLET | Refills: 0 | Status: SHIPPED | OUTPATIENT
Start: 2017-06-22 | End: 2017-06-27

## 2017-06-22 ASSESSMENT — ENCOUNTER SYMPTOMS
NAUSEA: 0
MYALGIAS: 0
DYSURIA: 0
WHEEZING: 0
PALPITATIONS: 0
ABDOMINAL PAIN: 0
SORE THROAT: 0
CHILLS: 0
HEADACHES: 0
HEMATURIA: 0
DIZZINESS: 0
FEVER: 0
FATIGUE: 0
ARTHRALGIAS: 1
SHORTNESS OF BREATH: 0

## 2017-06-22 NOTE — PROGRESS NOTES
Called patient, rash is getting worse. Now with blisters on hands and feet.     Patient states she needs a cane and needs a MD order for cane.     Stated that I have an appt available today to get punch biopsy. Will come in at 3pm.     Charmaine Ewing MD  [facility]  [phone]

## 2017-06-22 NOTE — LETTER
"July 5, 2017      Kimberly Wilder  [address] [facility] [address]        Dear Kimberly,    Thank you for getting your care at [facility]. Please see below for your test results.    Resulted Orders   Surgical pathology exam   Result Value Ref Range    Copath Report       Patient Name: KIMBERLY WILDER  MR#: [id number]  Specimen #: [id number]  Collected: 6/22/2017  Received: 6/26/2017  Reported: 6/28/2017 14:05  Ordering Phy(s): YOAV MARAVILLA    For improved result formatting, select 'View Enhanced Report Format'  under Linked Documents section.    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  - Methenamine silver stain negative for fungal microorganisms.    Electronically signed out by:    Patty Del Rosario M.D.    CLINICAL HISTORY:  66 year old female.  From electronic medical record:  Pruritic severe  maculopapular rash at 6/15/2017 clinic visit, over entire body with  scabbing pustules and including bilateral feet.  KOH prep was negative  for fungal elements.  Minimal improvement with antifungal agents and  steroid cream.  Patient states she has a bed bug infestation in her  mattress.    GROSS:  The specimen is received i n formalin, labeled with the patient's name  and date of birth, and designated \"skin, left medial foot\". It consists  of an unoriented, 0.3 x 0.3 cm lightly pigmented skin punch, excised to  a depth of 0.7 cm.  The cutaneous surface is grossly unremarkable.  The  resection margin is inked black.  The specimen is submitted entirely in  one cassette. (Dictated by: Homa BOOGIE 6/26/2017 09:42 AM)    MICROSCOPIC:  The epidermis is mature with moderate even acanthosis, moderate  hyperkeratosis and focal mild parakeratosis.  The granular layer is  mildly increased.  There is mild spongiosis with mild exocytosis of  small mature lymphocytes and a rare " spongiotic vesicle.  In the dermis  there is mild perivascular chronic inflammation with occasional  eosinophil.  There is no evidence of malignancy.  Methenamine silver  stain is performed and is negative for fungal microorganisms.  The  methenamine silver stain control reacts appropriately.    CPT Codes:  A: 33129-FG3, 20482-FBI    TESTING  LAB LOCATION:  [address] [address]  [phone]    COLLECTION SITE:  Client: [facility]  Location: [facility] ([facility])       Here is the pathology report. As discussed, finish up the prednisone course and follow up as needed.     If you have any concerns about these results please call and leave a message for me or send a MyCMedAwaret message to the clinic.    Sincerely,    Charmaine Ewing MD

## 2017-06-22 NOTE — PATIENT INSTRUCTIONS
Here is the plan from today's visit    1. Rash and nonspecific skin eruption  - Surgical pathology exam  - predniSONE (DELTASONE) 20 MG tablet; Take 2 tablets (40 mg) by mouth daily for 5 days  Dispense: 10 tablet; Refill: 0      2. Hip pain, bilateral  3. Functional gait abnormality  - order for DME; Patient needs Cane for gait abnormality due to feet and hip pain.  Dispense: 1 Units; Refill: 0    Follow up plan  Please make a clinic appointment for follow up with me (GUTIERREZ CHRISTOPHER) on Monday.    Thank you for coming to [facility] today.  Lab Testing:  **If you had lab testing today and your results are reassuring or normal they will be mailed to you or sent through Catmoji within 7 days.   **If the lab tests need quick action we will call you with the results.  The phone number we will call with results is # [phone] (home) . If this is not the best number please call our clinic and change the number.  Medication Refills:  If you need any refills please call your pharmacy and they will contact us.   If you need to  your refill at a new pharmacy, please contact the new pharmacy directly. The new pharmacy will help you get your medications transferred faster.   Scheduling:  If you have any concerns about today's visit or wish to schedule another appointment please call our office during normal business hours [phone] (8-5:00 M-F)    Medical Concerns:  If you have urgent medical concerns please call [phone] at any time of the day.  If you have a medical emergency please call 911.      Bleach Bath instructions:    Use regular strength - 6 percent - bleach for the bath. Do not use concentrated bleach.    Use a measuring cup or measuring spoon to add the bleach to the bath. Adding too much bleach to the bath can irritate your  skin. Adding too little bleach may not help.    Measure the amount of bleach before adding it to the bath water. For a full bathtub of water, use a half cup of  bleach. For a half-full tub of water, add a quarter cup of bleach.     Never apply bleach directly to your eczema. While the tub is filling, pour the bleach into the water. Be sure to wait until the bath is fully drawn and bleach is poured before your child enters the tub.    Most dermatologists recommend a five- to 10-minute soak.    Pat your  skin dry after the bath. If you use eczema medication, apply it immediately after the bath. Then moisturize your skin.

## 2017-06-22 NOTE — MR AVS SNAPSHOT
After Visit Summary   6/22/2017    Kimberly Laguna    MRN: [id number]           Patient Information     Date Of Birth          1950        Visit Information        Provider Department      6/22/2017 3:00 PM Charmaine Christopher MD [facility]        Today's Diagnoses     Rash and nonspecific skin eruption    -  1    Hip pain, bilateral        Functional gait abnormality          Care Instructions    Here is the plan from today's visit    1. Rash and nonspecific skin eruption  - Surgical pathology exam  - predniSONE (DELTASONE) 20 MG tablet; Take 2 tablets (40 mg) by mouth daily for 5 days  Dispense: 10 tablet; Refill: 0      2. Hip pain, bilateral  3. Functional gait abnormality  - order for DME; Patient needs Cane for gait abnormality due to feet and hip pain.  Dispense: 1 Units; Refill: 0    Follow up plan  Please make a clinic appointment for follow up with me (CHARMAINE CHRISTOPHER) on Monday.    Thank you for coming to [facility] today.  Lab Testing:  **If you had lab testing today and your results are reassuring or normal they will be mailed to you or sent through Smart GPS Backpack within 7 days.   **If the lab tests need quick action we will call you with the results.  The phone number we will call with results is # [phone] (home) . If this is not the best number please call our clinic and change the number.  Medication Refills:  If you need any refills please call your pharmacy and they will contact us.   If you need to  your refill at a new pharmacy, please contact the new pharmacy directly. The new pharmacy will help you get your medications transferred faster.   Scheduling:  If you have any concerns about today's visit or wish to schedule another appointment please call our office during normal business hours [phone] (8-5:00 M-F)    Medical Concerns:  If you have urgent medical concerns please call [phone] at any time of the day.  If you have a  medical emergency please call 911.      Bleach Bath instructions:    Use regular strength - 6 percent - bleach for the bath. Do not use concentrated bleach.    Use a measuring cup or measuring spoon to add the bleach to the bath. Adding too much bleach to the bath can irritate your  skin. Adding too little bleach may not help.    Measure the amount of bleach before adding it to the bath water. For a full bathtub of water, use a half cup of bleach. For a half-full tub of water, add a quarter cup of bleach.     Never apply bleach directly to your eczema. While the tub is filling, pour the bleach into the water. Be sure to wait until the bath is fully drawn and bleach is poured before your child enters the tub.    Most dermatologists recommend a five- to 10-minute soak.    Pat your  skin dry after the bath. If you use eczema medication, apply it immediately after the bath. Then moisturize your skin.          Follow-ups after your visit        Your next 10 appointments already scheduled     Jun 26, 2017 12:20 PM CDT   Return Visit with Charmaine Ewing MD   [facility] ([facility])    [address],  [address]  [place] [address]   [phone]              Future tests that were ordered for you today     Open Future Orders        Priority Expected Expires Ordered    Hepatitis C RNA quantitative Routine  6/22/2018 6/22/2017            Who to contact     Please call your clinic at [phone] to:    Ask questions about your health    Make or cancel appointments    Discuss your medicines    Learn about your test results    Speak to your doctor   If you have compliments or concerns about an experience at your clinic, or if you wish to file a complaint, please contact [facility] Patient Relations at [phone] or email us at [email or web address]         Additional Information About Your Visit        MyChart Information     Kasie is an  electronic gateway that provides easy, online access to your medical records. With adRise, you can request a clinic appointment, read your test results, renew a prescription or communicate with your care team.     To sign up for adRise visit the website at [email or web address]   You will be asked to enter the access code listed below, as well as some personal information. Please follow the directions to create your username and password.     Your access code is: [email or web address]  Expires: 7/10/2017  4:37 PM     Your access code will  in 90 days. If you need help or a new code, please contact your [facility] or call [phone] for assistance.        Care EveryWhere ID     This is your Care EveryWhere ID. This could be used by other organizations to access your [facility] medical records  [id number]        Your Vitals Were     Pulse Respirations BMI (Body Mass Index)             89 16 28.55 kg/m2          Blood Pressure from Last 3 Encounters:   17 107/75   06/15/17 108/72   17 125/81    Weight from Last 3 Encounters:   17 167 lb 9.6 oz (76 kg)   06/15/17 169 lb (76.7 kg)   17 173 lb 6.4 oz (78.7 kg)              We Performed the Following     Surgical pathology exam          Today's Medication Changes          These changes are accurate as of: 17  3:53 PM.  If you have any questions, ask your nurse or doctor.               Start taking these medicines.        Dose/Directions    order for DME   Used for:  Hip pain, bilateral, Functional gait abnormality   Started by:  Charmaine Ewing MD        Patient needs Cane for gait abnormality due to feet and hip pain.   Quantity:  1 Units   Refills:  0       predniSONE 20 MG tablet   Commonly known as:  DELTASONE   Used for:  Rash and nonspecific skin eruption   Started by:  Charmaine Ewing MD        Dose:  40 mg   Take 2 tablets (40 mg) by mouth daily for 5 days   Quantity:  10 tablet    Refills:  0            Where to get your medicines      These medications were sent to [facility] [place], [place] - [address] , [facility] [address]     Phone:  [phone]     predniSONE 20 MG tablet         Some of these will need a paper prescription and others can be bought over the counter.  Ask your nurse if you have questions.     Bring a paper prescription for each of these medications     order for DME                Primary Care Provider Office Phone # Fax #    Gautam Malcolm,  [phone]       [facility] [address]  [facility] [address]        Equal Access to Services     TROY BLOUNT : Hadii dakota joshi hadasho Sojose c, waaxda luqadaha, qaybta kaalmada adeegyada, tereza oden . So [facility] [phone].    ATENCIÓN: Si habla español, tiene a raygoza disposición servicios gratuitos de asistencia lingüística. [facility] [phone].    We comply with applicable federal civil rights laws and [place] laws. We do not discriminate on the basis of race, color, national origin, age, disability sex, sexual orientation or gender identity.            Thank you!     Thank you for choosing [facility]  for your care. Our goal is always to provide you with excellent care. Hearing back from our patients is one way we can continue to improve our services. Please take a few minutes to complete the written survey that you may receive in the mail after your visit with us. Thank you!             Your Updated Medication List - Protect others around you: Learn how to safely use, store and throw away your medicines at www.disposemymeds.org.          This list is accurate as of: 6/22/17  3:53 PM.  Always use your most recent med list.                   Brand Name Dispense Instructions for use Diagnosis    acyclovir 400 MG tablet    ZOVIRAX    15 tablet    Take 1 tablet (400 mg) by mouth 3 times daily    HSV  (herpes simplex virus) infection       clotrimazole 1 % cream    LOTRIMIN    15 g    Apply topically 2 times daily    Tinea pedis of both feet       clotrimazole-betamethasone cream    LOTRISONE    15 g    Apply topically 2 times daily    Tinea pedis of both feet       fexofenadine 180 MG tablet    ALLEGRA ALLERGY    30 tablet    Take 1 tablet (180 mg) by mouth daily    Polymorphous light eruption       fluocinonide 0.05 % ointment    LIDEX    60 g    Apply to hands twice a day for 14 days at a time    Dyshidrotic eczema       guaiFENesin-dextromethorphan 100-10 MG/5ML syrup    ROBITUSSIN DM    560 mL    Take 5 mLs by mouth every 4 hours as needed for cough    Cough       hydrocortisone 2.5 % ointment     30 g    Apply  topically 2 times daily.    Eczema       hydrOXYzine 25 MG tablet    ATARAX    30 tablet    Take 1-2 tablets (25-50 mg) by mouth nightly as needed for itching    Itching       order for DME     1 Units    Patient needs Cane for gait abnormality due to feet and hip pain.    Hip pain, bilateral, Functional gait abnormality       oxyCODONE 10 MG IR tablet    ROXICODONE     Take by mouth every 4 hours as needed        predniSONE 20 MG tablet    DELTASONE    10 tablet    Take 2 tablets (40 mg) by mouth daily for 5 days    Rash and nonspecific skin eruption       SEROQUEL 25 MG tablet   Generic drug:  QUEtiapine      Take 50 mg by mouth At Bedtime.        terbinafine 250 MG tablet    lamISIL    14 tablet    Take 1 tablet (250 mg) by mouth daily for 14 days    Tinea pedis of both feet       triamcinolone 0.1 % ointment    KENALOG    120 g    Apply topically 2 times daily Apply this to arms but not on hands.    Eczema, unspecified type
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
## 2017-06-22 NOTE — PROGRESS NOTES
[facility]  Skin Biopsy Procedure Note    Kimberly Laguna is a patient of Gautam Gaines here for severe rash    Consent: Affirmation of informed consent was signed and scanned into the medical record. Risks, benefits and alternatives were discussed. Patient's questions were elicited and answered.   Procedure safety checklist was completed:  Yes  Time Out (Pause for the Cause) completed: Yes    Preoperative Diagnosis: tenia pedis with superimposed dyshidrotic eczema    Location: left medial foot.    Size:  3mm  Postoperative Diagnosis: same    Technique:   Skin prep Alcohol  Anesthesia 1.5 cc 2% lidocaine, with epi   Biopsy size 3mm  Biopsy taken via (shave, punch, incisional) punch  Suture  none  Hemostasis  Drysol and pressure    EBL:    minimal  Complications:  No  Tolerance:   Pt tolerated procedure well and was in stable condition.   Pathology sent Yes    Instructions:    Pt should keep dressing in place for 24 hours, then may change and apply antibiotic ointment and simple bandage. May shower after 24 hours.  Pt was instructed to call if bleeding, severe pain or foul smell.   Follow up in 4 days.       Resident: Charmaine Ewing  Faculty: Luz Maria Edgar MD present for and supervised this entire procedure.

## 2017-06-22 NOTE — PROGRESS NOTES
Preceptor Attestation:   Patient seen and discussed with the resident. I was present for and supervised the entire procedure. Assessment and plan reviewed with resident and agreed upon.   Supervising Physician:  Luz Maria Edgar MD  [facility]

## 2017-06-26 ENCOUNTER — OFFICE VISIT (OUTPATIENT)
Dept: FAMILY MEDICINE | Facility: CLINIC | Age: 67
End: 2017-06-26

## 2017-06-26 VITALS
OXYGEN SATURATION: 98 % | DIASTOLIC BLOOD PRESSURE: 79 MMHG | RESPIRATION RATE: 16 BRPM | SYSTOLIC BLOOD PRESSURE: 149 MMHG | TEMPERATURE: 98.3 F | HEART RATE: 84 BPM | WEIGHT: 168.4 LBS | BODY MASS INDEX: 28.68 KG/M2

## 2017-06-26 DIAGNOSIS — R21 RASH AND NONSPECIFIC SKIN ERUPTION: ICD-10-CM

## 2017-06-26 DIAGNOSIS — R03.0 ELEVATED BLOOD PRESSURE READING WITHOUT DIAGNOSIS OF HYPERTENSION: Primary | ICD-10-CM

## 2017-06-26 DIAGNOSIS — B35.3 TINEA PEDIS OF BOTH FEET: ICD-10-CM

## 2017-06-26 RX ORDER — CLOTRIMAZOLE 1 %
CREAM (GRAM) TOPICAL 2 TIMES DAILY
Qty: 15 G | Refills: 1 | Status: SHIPPED | OUTPATIENT
Start: 2017-06-26 | End: 2017-07-10

## 2017-06-26 RX ORDER — PREDNISONE 10 MG/1
TABLET ORAL
Qty: 14 TABLET | Refills: 0 | Status: SHIPPED | OUTPATIENT
Start: 2017-06-26 | End: 2017-07-12

## 2017-06-26 RX ORDER — OXYCODONE HYDROCHLORIDE 5 MG/1
5 TABLET ORAL EVERY 4 HOURS PRN
Qty: 10 TABLET | Refills: 0 | Status: SHIPPED | OUTPATIENT
Start: 2017-06-26 | End: 2017-07-12

## 2017-06-26 ASSESSMENT — ENCOUNTER SYMPTOMS
FATIGUE: 0
CHILLS: 0
SORE THROAT: 0
FEVER: 0
DYSURIA: 0
PALPITATIONS: 0
MYALGIAS: 0
NAUSEA: 0
HEADACHES: 0
WHEEZING: 0
ARTHRALGIAS: 1
SHORTNESS OF BREATH: 0
ABDOMINAL PAIN: 0
DIZZINESS: 0
HEMATURIA: 0

## 2017-06-26 NOTE — MR AVS SNAPSHOT
After Visit Summary   6/26/2017    Kimberly Laguna    MRN: [id number]           Patient Information     Date Of Birth          1950        Visit Information        Provider Department      6/26/2017 12:20 PM Charmaine Ewing MD [facility]        Today's Diagnoses     Rash and nonspecific skin eruption        Tinea pedis of both feet          Care Instructions    Here is the plan from today's visit    1. Rash and nonspecific skin eruption  - predniSONE (DELTASONE) 10 MG tablet; Take 20mg for 5 days, then 10 for 3 days, then 5 for 2 days then stop.  Dispense: 14 tablet; Refill: 0  - oxyCODONE (ROXICODONE) 5 MG IR tablet; Take 1 tablet (5 mg) by mouth every 4 hours as needed for pain  Dispense: 10 tablet; Refill: 0    2. Tinea pedis of both feet  - clotrimazole (LOTRIMIN) 1 % cream; Apply topically 2 times daily  Dispense: 15 g; Refill: 1      - continue using the anti-fungal cream  - taper of prednisone to complete  - use oxycodone and ibuprofen for pain control.     Please call or return to clinic if your symptoms don't go away.    Follow up plan  Please make a clinic appointment for follow up with your primary physician Gautam Malcolm in 2 weeks for follow up.    Thank you for coming to [facility] today.  Lab Testing:  **If you had lab testing today and your results are reassuring or normal they will be mailed to you or sent through [employer] within 7 days.   **If the lab tests need quick action we will call you with the results.  The phone number we will call with results is # [phone] (home) . If this is not the best number please call our clinic and change the number.  Medication Refills:  If you need any refills please call your pharmacy and they will contact us.   If you need to  your refill at a new pharmacy, please contact the new pharmacy directly. The new pharmacy will help you get your medications transferred faster.    Scheduling:  If you have any concerns about today's visit or wish to schedule another appointment please call our office during normal business hours [phone] (8-5:00 M-F)  If a referral was made to a [facility] and you don't get a call from central scheduling please call [phone].  If a Mammogram was ordered for you at [facility] call [phone] to schedule or change your appointment.  If you had an XRay/CT/Ultrasound/MRI ordered the number is [phone] to schedule or change your radiology appointment.   Medical Concerns:  If you have urgent medical concerns please call [phone] at any time of the day.  If you have a medical emergency please call [phone].            Follow-ups after your visit        Who to contact     Please call your clinic at [phone] to:    Ask questions about your health    Make or cancel appointments    Discuss your medicines    Learn about your test results    Speak to your doctor   If you have compliments or concerns about an experience at your clinic, or if you wish to file a complaint, please contact [facility] Patient Relations at [phone] or email us at [email or web address]         Additional Information About Your Visit        [email or web address] Information     [employer] is an electronic gateway that provides easy, online access to your medical records. With [employer], you can request a clinic appointment, read your test results, renew a prescription or communicate with your care team.     To sign up for [employer] visit the website at [email or web address]   You will be asked to enter the access code listed below, as well as some personal information. Please follow the directions to create your username and password.     Your access code is: [email or web address]  Expires: 7/10/2017  4:37 PM     Your access code will  in 90 days. If you need help or a new code, please contact your [facility] or call [phone] for assistance.        Care EveryWhere ID     This is your Care EveryWhere ID. This could be used by other organizations to access your [place] medical records  [id number]        Your Vitals Were     Pulse Temperature Respirations Pulse Oximetry BMI (Body Mass Index)       84 98.3  F (36.8  C) (Oral) 16 98% 28.68 kg/m2        Blood Pressure from Last 3 Encounters:   06/26/17 149/79   06/22/17 107/75   06/15/17 108/72    Weight from Last 3 Encounters:   06/26/17 168 lb 6.4 oz (76.4 kg)   06/22/17 167 lb 9.6 oz (76 kg)   06/15/17 169 lb (76.7 kg)              Today, you had the following     No orders found for display         Today's Medication Changes          These changes are accurate as of: 6/26/17  1:06 PM.  If you have any questions, ask your nurse or doctor.               These medicines have changed or have updated prescriptions.        Dose/Directions    * oxyCODONE 10 MG IR tablet   Commonly known as:  ROXICODONE   This may have changed:  Another medication with the same name was added. Make sure you understand how and when to take each.   Changed by:  BONILLA Henderson MD        Take by mouth every 4 hours as needed   Refills:  0       * oxyCODONE 5 MG IR tablet   Commonly known as:  ROXICODONE   This may have changed:  You were already taking a medication with the same name, and this prescription was added. Make sure you understand how and when to take each.   Used for:  Rash and nonspecific skin eruption   Changed by:  Charmaine Ewing MD        Dose:  5 mg   Take 1 tablet (5 mg) by mouth every 4 hours as needed for pain   Quantity:  10 tablet   Refills:  0       * predniSONE 20 MG tablet   Commonly known as:  DELTASONE   This may have changed:  Another medication with the same name was added. Make sure you understand how and when to take each.   Used for:  Rash and nonspecific skin eruption   Changed by:  Charmaine Ewing MD        Dose:  40 mg    Take 2 tablets (40 mg) by mouth daily for 5 days   Quantity:  10 tablet   Refills:  0       * predniSONE 10 MG tablet   Commonly known as:  DELTASONE   This may have changed:  You were already taking a medication with the same name, and this prescription was added. Make sure you understand how and when to take each.   Used for:  Rash and nonspecific skin eruption   Changed by:  Charmaine Ewing MD        Take 20mg for 5 days, then 10 for 3 days, then 5 for 2 days then stop.   Quantity:  14 tablet   Refills:  0       * Notice:  This list has 4 medication(s) that are the same as other medications prescribed for you. Read the directions carefully, and ask your doctor or other care provider to review them with you.         Where to get your medicines      These medications were sent to [place] [address] [place] [facility] [address] [address]     Phone:  [phone]     clotrimazole 1 % cream    predniSONE 10 MG tablet         Some of these will need a paper prescription and others can be bought over the counter.  Ask your nurse if you have questions.     Bring a paper prescription for each of these medications     oxyCODONE 5 MG IR tablet                Primary Care Provider Office Phone # Fax #    Gautam Charmaine Malcolm [phone]       [facility] [address] [facility] [address]        Equal Access to Services     TROY BLOUNT AH: Hadii dakota joshi hadasho Soomaali, waaxda luqadaha, qaybta kaalmada adeegyada, tereza epstein. So [facility] [phone].    ATENCIÓN: Si habla español, tiene a raygoza disposición servicios gratuitos de asistencia lingüística. Llame al [phone].    We comply with applicable federal civil rights laws and [place] laws. We do not discriminate on the basis of race, color, national origin, age, disability sex, sexual orientation or gender identity.            Thank you!     Thank you for  choosing [facility]  for your care. Our goal is always to provide you with excellent care. Hearing back from our patients is one way we can continue to improve our services. Please take a few minutes to complete the written survey that you may receive in the mail after your visit with us. Thank you!             Your Updated Medication List - Protect others around you: Learn how to safely use, store and throw away your medicines at www.disposemymeds.org.          This list is accurate as of: 6/26/17  1:06 PM.  Always use your most recent med list.                   Brand Name Dispense Instructions for use Diagnosis    acyclovir 400 MG tablet    ZOVIRAX    15 tablet    Take 1 tablet (400 mg) by mouth 3 times daily    HSV (herpes simplex virus) infection       clotrimazole 1 % cream    LOTRIMIN    15 g    Apply topically 2 times daily    Tinea pedis of both feet       clotrimazole-betamethasone cream    LOTRISONE    15 g    Apply topically 2 times daily    Tinea pedis of both feet       fexofenadine 180 MG tablet    ALLEGRA ALLERGY    30 tablet    Take 1 tablet (180 mg) by mouth daily    Polymorphous light eruption       fluocinonide 0.05 % ointment    LIDEX    60 g    Apply to hands twice a day for 14 days at a time    Dyshidrotic eczema       guaiFENesin-dextromethorphan 100-10 MG/5ML syrup    ROBITUSSIN DM    560 mL    Take 5 mLs by mouth every 4 hours as needed for cough    Cough       hydrocortisone 2.5 % ointment     30 g    Apply  topically 2 times daily.    Eczema       hydrOXYzine 25 MG tablet    ATARAX    30 tablet    Take 1-2 tablets (25-50 mg) by mouth nightly as needed for itching    Itching       order for DME     1 Units    Patient needs Cane for gait abnormality due to feet and hip pain.    Hip pain, bilateral, Functional gait abnormality       * oxyCODONE 10 MG IR tablet    ROXICODONE     Take by mouth every 4 hours as needed        * oxyCODONE 5 MG IR tablet    ROXICODONE    10  tablet    Take 1 tablet (5 mg) by mouth every 4 hours as needed for pain    Rash and nonspecific skin eruption       * predniSONE 20 MG tablet    DELTASONE    10 tablet    Take 2 tablets (40 mg) by mouth daily for 5 days    Rash and nonspecific skin eruption       * predniSONE 10 MG tablet    DELTASONE    14 tablet    Take 20mg for 5 days, then 10 for 3 days, then 5 for 2 days then stop.    Rash and nonspecific skin eruption       SEROQUEL 25 MG tablet   Generic drug:  QUEtiapine      Take 50 mg by mouth At Bedtime.        terbinafine 250 MG tablet    lamISIL    14 tablet    Take 1 tablet (250 mg) by mouth daily for 14 days    Tinea pedis of both feet       triamcinolone 0.1 % ointment    KENALOG    120 g    Apply topically 2 times daily Apply this to arms but not on hands.    Eczema, unspecified type       * Notice:  This list has 4 medication(s) that are the same as other medications prescribed for you. Read the directions carefully, and ask your doctor or other care provider to review them with you.

## 2017-06-26 NOTE — PROGRESS NOTES
Preceptor Attestation:   Patient seen and discussed with the resident. Assessment and plan reviewed with resident and agreed upon.   Supervising Physician:  Katlyn Campbell MD  [facility]
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
## 2017-06-26 NOTE — PATIENT INSTRUCTIONS
Here is the plan from today's visit    1. Rash and nonspecific skin eruption  - predniSONE (DELTASONE) 10 MG tablet; Take 20mg for 5 days, then 10 for 3 days, then 5 for 2 days then stop.  Dispense: 14 tablet; Refill: 0  - oxyCODONE (ROXICODONE) 5 MG IR tablet; Take 1 tablet (5 mg) by mouth every 4 hours as needed for pain  Dispense: 10 tablet; Refill: 0    2. Tinea pedis of both feet  - clotrimazole (LOTRIMIN) 1 % cream; Apply topically 2 times daily  Dispense: 15 g; Refill: 1      - continue using the anti-fungal cream  - taper of prednisone to complete  - use oxycodone and ibuprofen for pain control.     Please call or return to clinic if your symptoms don't go away.    Follow up plan  Please make a clinic appointment for follow up with your primary physician Gautam Malcolm in 2 weeks for follow up.    Thank you for coming to [facility] today.  Lab Testing:  **If you had lab testing today and your results are reassuring or normal they will be mailed to you or sent through [employer] within 7 days.   **If the lab tests need quick action we will call you with the results.  The phone number we will call with results is # [phone] (home) . If this is not the best number please call our clinic and change the number.  Medication Refills:  If you need any refills please call your pharmacy and they will contact us.   If you need to  your refill at a new pharmacy, please contact the new pharmacy directly. The new pharmacy will help you get your medications transferred faster.   Scheduling:  If you have any concerns about today's visit or wish to schedule another appointment please call our office during normal business hours [phone] (8-5:00 M-F)  If a referral was made to a [facility] and you don't get a call from central scheduling please call [phone].  If a Mammogram was ordered for you at [facility] call [phone] to schedule or change your  appointment.  If you had an XRay/CT/Ultrasound/MRI ordered the number is [phone] to schedule or change your radiology appointment.   Medical Concerns:  If you have urgent medical concerns please call [phone] at any time of the day.  If you have a medical emergency please call [phone].

## 2017-06-28 PROBLEM — L30.8 SPONGIOTIC PSORIASIFORM DERMATITIS: Status: ACTIVE | Noted: 2017-06-28

## 2017-06-28 LAB — COPATH REPORT: NORMAL

## 2017-07-05 ENCOUNTER — TELEPHONE (OUTPATIENT)
Dept: FAMILY MEDICINE | Facility: CLINIC | Age: 67
End: 2017-07-05

## 2017-07-05 NOTE — TELEPHONE ENCOUNTER
Patient reports that rash has significantly improved. Has 2 more days of 5mg prednisone. Discussed that she should watch out for any flares after stopping the prednisone. She will follow up in the clinic as needed.     Charmaine Ewing MD  [facility]  [phone]

## 2017-07-10 ENCOUNTER — TELEPHONE (OUTPATIENT)
Dept: FAMILY MEDICINE | Facility: CLINIC | Age: 67
End: 2017-07-10

## 2017-07-10 DIAGNOSIS — L29.9 ITCHING: ICD-10-CM

## 2017-07-10 DIAGNOSIS — B35.3 TINEA PEDIS OF BOTH FEET: ICD-10-CM

## 2017-07-10 RX ORDER — HYDROXYZINE HYDROCHLORIDE 25 MG/1
25-50 TABLET, FILM COATED ORAL 2 TIMES DAILY PRN
Qty: 60 TABLET | Refills: 1 | Status: SHIPPED | OUTPATIENT
Start: 2017-07-10 | End: 2019-11-21

## 2017-07-10 RX ORDER — CLOTRIMAZOLE 1 %
CREAM (GRAM) TOPICAL 2 TIMES DAILY
Qty: 15 G | Refills: 2 | Status: SHIPPED | OUTPATIENT
Start: 2017-07-10 | End: 2017-09-12

## 2017-07-10 NOTE — TELEPHONE ENCOUNTER
Received call from Kimberly at [facility]. Reports that her rash on her hands and feet are not improving, but she stopped using the creams. Still has bed bugs infestation with severe itching.     Discussed that she should be seen in the office to be evaluated. Refilled the atarax and lotrimin per patient request.     She will also do the bleach baths this week and will see if there is any improvement and then go into to be evaluated.     Charmaine Ewing MD  [facility]  [phone]

## 2017-07-12 ENCOUNTER — OFFICE VISIT (OUTPATIENT)
Dept: FAMILY MEDICINE | Facility: CLINIC | Age: 67
End: 2017-07-12

## 2017-07-12 VITALS
DIASTOLIC BLOOD PRESSURE: 80 MMHG | BODY MASS INDEX: 28.61 KG/M2 | SYSTOLIC BLOOD PRESSURE: 116 MMHG | WEIGHT: 168 LBS | HEART RATE: 104 BPM | RESPIRATION RATE: 18 BRPM | OXYGEN SATURATION: 96 % | TEMPERATURE: 98.5 F

## 2017-07-12 DIAGNOSIS — L30.8 SPONGIOTIC PSORIASIFORM DERMATITIS: Primary | ICD-10-CM

## 2017-07-12 RX ORDER — CALAMINE
LOTION (ML) TOPICAL 3 TIMES DAILY
Qty: 240 ML | Refills: 1 | Status: SHIPPED | OUTPATIENT
Start: 2017-07-12 | End: 2019-11-21

## 2017-07-12 RX ORDER — TRIAMCINOLONE ACETONIDE 5 MG/G
CREAM TOPICAL
Qty: 30 G | Refills: 1 | Status: SHIPPED | OUTPATIENT
Start: 2017-07-12 | End: 2017-08-09

## 2017-07-12 NOTE — MR AVS SNAPSHOT
After Visit Summary   7/12/2017    Kimberly Laguna    MRN: [id number]           Patient Information     Date Of Birth          1950        Visit Information        Provider Department      7/12/2017 3:20 PM Melony Avery MD [facility]        Today's Diagnoses     Spongiotic psoriasiform dermatitis    -  1      Care Instructions    Here is the plan from today's visit    1. Spongiotic psoriasiform dermatitis  1. Start triamcinolone 0.5% ointment three times daily for 2 weeks, then three times weekly for two weeks, repeat cycle for flares. Discussed risk of skin atrophy with long term chronic use. Not for face, armpits or groin.   2.Use gentle soap such as Dove for Sensitive Skin and lukewarm water for bathing. Regular use of an emollient such as Vanicream, Cerave or Cetaphil.   - triamcinolone (KENALOG) 0.5 % cream; Apply sparingly to affected area three times daily.  Dispense: 30 g; Refill: 1  - calamine lotion; Apply topically 3 times daily  Dispense: 240 mL; Refill: 1      Please call or return to clinic if your symptoms don't go away.    Follow up plan  FOllow up in one week     Thank you for coming to [facility] today.  Lab Testing:  **If you had lab testing today and your results are reassuring or normal they will be mailed to you or sent through [employer] within 7 days.   **If the lab tests need quick action we will call you with the results.  The phone number we will call with results is # [phone] (home) . If this is not the best number please call our clinic and change the number.  Medication Refills:  If you need any refills please call your pharmacy and they will contact us.   If you need to  your refill at a new pharmacy, please contact the new pharmacy directly. The new pharmacy will help you get your medications transferred faster.   Scheduling:  If you have any concerns about today's visit or wish to schedule another appointment please call  our office during normal business hours [phone] (8-5:00 M-F)  If a referral was made to a [facility] and you don't get a call from central scheduling please call [phone].  If a Mammogram was ordered for you at [facility] call [phone] to schedule or change your appointment.  If you had an XRay/CT/Ultrasound/MRI ordered the number is [phone] to schedule or change your radiology appointment.   Medical Concerns:  If you have urgent medical concerns please call [phone] at any time of the day.  If you have a medical emergency please call 155.          Follow-ups after your visit        Who to contact     Please call your clinic at [phone] to:    Ask questions about your health    Make or cancel appointments    Discuss your medicines    Learn about your test results    Speak to your doctor   If you have compliments or concerns about an experience at your clinic, or if you wish to file a complaint, please contact [facility] Patient Relations at [phone] or email us at [email or web address]         Additional Information About Your Visit        [employer] Information     [employer] is an electronic gateway that provides easy, online access to your medical records. With [employer], you can request a clinic appointment, read your test results, renew a prescription or communicate with your care team.     To sign up for [employer] visit the website at [email or web address]   You will be asked to enter the access code listed below, as well as some personal information. Please follow the directions to create your username and password.     Your access code is: [email or web address]  Expires: 10/10/2017  4:09 PM     Your access code will  in 90 days. If you need help or a new code, please contact your [facility] or call [phone] for assistance.        Care EveryWhere ID     This is your Care EveryWhere ID.  This could be used by other organizations to access your [place] medical records  [id number]        Your Vitals Were     Pulse Temperature Respirations Pulse Oximetry BMI (Body Mass Index)       104 98.5  F (36.9  C) (Oral) 18 96% 28.61 kg/m2        Blood Pressure from Last 3 Encounters:   07/12/17 116/80   06/26/17 149/79   06/22/17 107/75    Weight from Last 3 Encounters:   07/12/17 168 lb (76.2 kg)   06/26/17 168 lb 6.4 oz (76.4 kg)   06/22/17 167 lb 9.6 oz (76 kg)              Today, you had the following     No orders found for display         Today's Medication Changes          These changes are accurate as of: 7/12/17  4:09 PM.  If you have any questions, ask your nurse or doctor.               Start taking these medicines.        Dose/Directions    calamine lotion   Used for:  Spongiotic psoriasiform dermatitis   Started by:  Melony Avery MD        Apply topically 3 times daily   Quantity:  240 mL   Refills:  1       triamcinolone 0.5 % cream   Commonly known as:  KENALOG   Used for:  Spongiotic psoriasiform dermatitis   Started by:  eMlony Avery MD        Apply sparingly to affected area three times daily.   Quantity:  30 g   Refills:  1            Where to get your medicines      These medications were sent to [facility] [address] [place], [place] - [address]  [address] [facility] [address]     Phone:  [phone]     calamine lotion    triamcinolone 0.5 % cream                Primary Care Provider Office Phone # Fax #    Gautamidalmis Malcolm,  [phone]       [facility] [address] [facility] [address]        Equal Access to Services     TROY BLOUNT AH: Hadii dakota monterroso Sojose c, waaxda luqadaha, qaybta kaalmada adeegyada, tereza epstein. So [facility] [phone].    ATENCIÓN: Si habla español, tiene a raygoza disposición servicios gratuitos de asistencia lingüística. Llame al [phone].    We comply with  applicable federal civil rights laws and [place] laws. We do not discriminate on the basis of race, color, national origin, age, disability sex, sexual orientation or gender identity.            Thank you!     Thank you for choosing [facility]  for your care. Our goal is always to provide you with excellent care. Hearing back from our patients is one way we can continue to improve our services. Please take a few minutes to complete the written survey that you may receive in the mail after your visit with us. Thank you!             Your Updated Medication List - Protect others around you: Learn how to safely use, store and throw away your medicines at www.disposemymeds.org.          This list is accurate as of: 7/12/17  4:09 PM.  Always use your most recent med list.                   Brand Name Dispense Instructions for use Diagnosis    calamine lotion     240 mL    Apply topically 3 times daily    Spongiotic psoriasiform dermatitis       clotrimazole 1 % cream    LOTRIMIN    15 g    Apply topically 2 times daily    Tinea pedis of both feet       hydrOXYzine 25 MG tablet    ATARAX    60 tablet    Take 1-2 tablets (25-50 mg) by mouth 2 times daily as needed for itching    Itching       order for DME     1 Units    Patient needs Cane for gait abnormality due to feet and hip pain.    Hip pain, bilateral, Functional gait abnormality       SEROQUEL 25 MG tablet   Generic drug:  QUEtiapine      Take 50 mg by mouth At Bedtime.        triamcinolone 0.5 % cream    KENALOG    30 g    Apply sparingly to affected area three times daily.    Spongiotic psoriasiform dermatitis

## 2017-07-12 NOTE — PATIENT INSTRUCTIONS
Here is the plan from today's visit    1. Spongiotic psoriasiform dermatitis  1. Start triamcinolone 0.5% ointment three times daily for 2 weeks, then three times weekly for two weeks, repeat cycle for flares. Discussed risk of skin atrophy with long term chronic use. Not for face, armpits or groin.   2.Use gentle soap such as Dove for Sensitive Skin and lukewarm water for bathing. Regular use of an emollient such as Vanicream, Cerave or Cetaphil.   - triamcinolone (KENALOG) 0.5 % cream; Apply sparingly to affected area three times daily.  Dispense: 30 g; Refill: 1  - calamine lotion; Apply topically 3 times daily  Dispense: 240 mL; Refill: 1      Please call or return to clinic if your symptoms don't go away.    Follow up plan  FOllow up in one week     Thank you for coming to [facility] today.  Lab Testing:  **If you had lab testing today and your results are reassuring or normal they will be mailed to you or sent through [employer] within 7 days.   **If the lab tests need quick action we will call you with the results.  The phone number we will call with results is # [phone] (home) . If this is not the best number please call our clinic and change the number.  Medication Refills:  If you need any refills please call your pharmacy and they will contact us.   If you need to  your refill at a new pharmacy, please contact the new pharmacy directly. The new pharmacy will help you get your medications transferred faster.   Scheduling:  If you have any concerns about today's visit or wish to schedule another appointment please call our office during normal business hours [phone] (8-5:00 M-F)  If a referral was made to a [facility] and you don't get a call from central scheduling please call [phone].  If a Mammogram was ordered for you at [facility] call [phone] to schedule or change your appointment.  If you had an XRay/CT/Ultrasound/MRI ordered the number is  [phone] to schedule or change your radiology appointment.   Medical Concerns:  If you have urgent medical concerns please call [phone] at any time of the day.  If you have a medical emergency please call 349.

## 2017-07-12 NOTE — PROGRESS NOTES
HPI:       Kimberly Laguna is a 66 year old who presents for the following  Patient presents with:  Derm Problem: blisters/rash on both feet. Soles of feet are red and painful/itchy      Rash/Lesion     Onset: 9 months ago    Description:   Location: soles of both feet  Color: Reddish sores  Character: round, blotchy, painful, red  Itching (Pruritis): yes  Pain?:Yes    Progression of Symptoms:  worsening    Accompanying Signs & Symptoms:  Fever: no  Body aches or joint pain:  no  Sore throat symptoms:no  Recent cold symptoms: no   History:   Previous similar rash: Yes Details: has had it for 9 months    Precipitating factors:   Exposure to similar rash: no  New exposures: {no  Recent travel: no  New Medication: no    What makes it better?:  Prediisone oral med and anti-fungal cream      Therapies Tried and outcome:  Nothing seems to be working      Problem, Medication and Allergy Lists were reviewed and are current.  Patient is an established patient of this clinic.         Review of Systems:   Review of Systems   Constitutional: Negative for fever.   Respiratory: Negative for cough and shortness of breath.    Cardiovascular: Negative for chest pain, palpitations and leg swelling.   Skin: Positive for rash.             Physical Exam:   /80  Pulse 104  Temp 98.5  F (36.9  C) (Oral)  Resp 18  Wt 168 lb (76.2 kg)  SpO2 96%  BMI 28.61 kg/m2  There is no height or weight on file to calculate BMI.  Vitals were reviewed and were normal     Physical Exam   Constitutional: She is oriented to person, place, and time. She appears well-developed and well-nourished. No distress.   HENT:   Head: Normocephalic and atraumatic.   Cardiovascular: Normal rate, regular rhythm and normal heart sounds.    No murmur heard.  Pulmonary/Chest: Effort normal and breath sounds normal. No respiratory distress. She has no wheezes. She has no rales.   Neurological: She is alert and oriented to person, place, and time.   Skin:  Diffuse maculopapular rash with excoriation on her bilateral feet, improving compared to last week.    Results:      Results from the last 24 hoursNo results found for this or any previous visit (from the past 24 hour(s)).  Assessment and Plan     Kimberly was seen today for derm problem.    Diagnoses and all orders for this visit:    Spongiotic psoriasiform dermatitis:  -Start triamcinolone 0.5% ointment three times daily for 2 weeks, then three times weekly for two weeks, repeat cycle for flares. Discussed risk of skin atrophy with long term chronic use. Not for face, armpits or groin.   -Use gentle soap such as Dove for Sensitive Skin and lukewarm water for bathing. Regular use of an emollient such as Vanicream, Cerave or Cetaphil.   -Follow up in one week.   -     triamcinolone (KENALOG) 0.5 % cream; Apply sparingly to affected area three times daily.  -     calamine lotion; Apply topically 3 times daily      Medications Discontinued During This Encounter   Medication Reason     triamcinolone (KENALOG) 0.1 % ointment Discontinued by another Health Care Provider     predniSONE (DELTASONE) 10 MG tablet Therapy completed     oxyCODONE (ROXICODONE) 10 MG immediate release tablet Therapy completed     oxyCODONE (ROXICODONE) 5 MG IR tablet Therapy completed     Options for treatment and follow-up care were reviewed with the patient. Kimberly Jennifer Laguna  engaged in the decision making process and verbalized understanding of the options discussed and agreed with the final plan.    Melony Avery MD  PGY 3 [facility]  [phone](pg)

## 2017-07-15 ASSESSMENT — ENCOUNTER SYMPTOMS
PALPITATIONS: 0
COUGH: 0
SHORTNESS OF BREATH: 0
FEVER: 0

## 2017-07-19 NOTE — PROGRESS NOTES
Preceptor Attestation:   Patient seen and discussed with the resident. Assessment and plan reviewed with resident and agreed upon.   Supervising Physician:  Shahid Ceja MD  [facility]

## 2017-08-09 DIAGNOSIS — L30.8 SPONGIOTIC PSORIASIFORM DERMATITIS: ICD-10-CM

## 2017-08-09 RX ORDER — TRIAMCINOLONE ACETONIDE 5 MG/G
CREAM TOPICAL
Qty: 30 G | Refills: 1 | Status: SHIPPED | OUTPATIENT
Start: 2017-08-09 | End: 2017-08-11

## 2017-08-09 NOTE — TELEPHONE ENCOUNTER
Request for medication refill:    Date of last visit at clinic: 7-12-17    Please complete refill if appropriate and CLOSE ENCOUNTER.    Closing the encounter signifies the refill is complete.    If refill has been denied, please complete the smart phrase .smirefuse and route it to the [facility] RN TRIAGE pool to inform the patient and the pharmacy.    Maricel Whiting MA

## 2017-08-11 ENCOUNTER — OFFICE VISIT (OUTPATIENT)
Dept: FAMILY MEDICINE | Facility: CLINIC | Age: 67
End: 2017-08-11

## 2017-08-11 VITALS
RESPIRATION RATE: 18 BRPM | SYSTOLIC BLOOD PRESSURE: 132 MMHG | TEMPERATURE: 98.2 F | OXYGEN SATURATION: 99 % | WEIGHT: 164 LBS | DIASTOLIC BLOOD PRESSURE: 83 MMHG | BODY MASS INDEX: 27.93 KG/M2 | HEART RATE: 94 BPM

## 2017-08-11 DIAGNOSIS — L30.8 SPONGIOTIC PSORIASIFORM DERMATITIS: ICD-10-CM

## 2017-08-11 DIAGNOSIS — L30.1 DYSHIDROTIC ECZEMA: Primary | ICD-10-CM

## 2017-08-11 RX ORDER — PREDNISONE 20 MG/1
TABLET ORAL
Qty: 33 TABLET | Refills: 0 | Status: SHIPPED | OUTPATIENT
Start: 2017-08-11 | End: 2019-11-21

## 2017-08-11 RX ORDER — TRIAMCINOLONE ACETONIDE 5 MG/G
CREAM TOPICAL
Qty: 60 G | Refills: 1 | Status: SHIPPED | OUTPATIENT
Start: 2017-08-11 | End: 2017-09-12

## 2017-08-11 ASSESSMENT — ENCOUNTER SYMPTOMS
HEMATURIA: 0
SORE THROAT: 0
ARTHRALGIAS: 1
NAUSEA: 0
CHILLS: 0
MYALGIAS: 0
SHORTNESS OF BREATH: 0
PALPITATIONS: 0
HEADACHES: 0
FEVER: 0
FATIGUE: 0
ABDOMINAL PAIN: 0
DIZZINESS: 0
WHEEZING: 0
DYSURIA: 0

## 2017-08-11 NOTE — MR AVS SNAPSHOT
After Visit Summary   8/11/2017    Kimberly Laguna    MRN: [id number]           Patient Information     Date Of Birth          1950        Visit Information        Provider Department      8/11/2017 4:00 PM Charmaine Christopher MD [facility]        Today's Diagnoses     Dyshidrotic eczema    -  1    Spongiotic psoriasiform dermatitis          Care Instructions    Here is the plan from today's visit    1. Dyshidrotic eczema  - predniSONE (DELTASONE) 20 MG tablet; Take 3 tabs (60 mg) by mouth daily x 5 days, 2 tabs (40 mg) daily x 5 days, 1 tab (20 mg) daily x 5 days, then 1/2 tab (10 mg) x 6 days.  Dispense: 33 tablet; Refill: 0    2. Spongiotic psoriasiform dermatitis  - triamcinolone (KENALOG) 0.5 % cream; Apply sparingly to affected area three times daily.  Dispense: 60 g; Refill: 1    You will need to do the Bleach Baths at least 3 times a week, and foot and hand bleach baths everyday. After the baths, put the Eucerin cream on your skin. Use the hydroxyzine as needed for itching.     Follow up plan  Please make a clinic appointment for follow up with me (CHARMAINE CHRISTOPHER) in 3-4  weeks for follow up.    Thank you for coming to [facility] today.  Lab Testing:  **If you had lab testing today and your results are reassuring or normal they will be mailed to you or sent through [employer] within 7 days.   **If the lab tests need quick action we will call you with the results.  The phone number we will call with results is # [phone] (home) . If this is not the best number please call our clinic and change the number.  Medication Refills:  If you need any refills please call your pharmacy and they will contact us.   If you need to  your refill at a new pharmacy, please contact the new pharmacy directly. The new pharmacy will help you get your medications transferred faster.   Scheduling:  If you have any concerns about today's visit or wish to schedule  "another appointment please call our office during normal business hours [phone] (8-5:00 M-F)    Medical Concerns:  If you have urgent medical concerns please call [phone] at any time of the day.  If you have a medical emergency please call 911.  Pediatric Dermatology       Bleach Bath Instructions  What are dilute bleach baths?  Dilute bleach baths are used to help fight bacteria that is commonly found on the skin; this bacteria may be preventing your skin from healing. If is also used to calm inflammation in skin, even if infection is not present. The dilution ratio we recommend is the same concentration that is in a swimming pool.     Type;  *Regular, plain household bleach used for cleaning clothing. Brand or Generic is okay.   *Make sure this is plain or concentrated bleach. This should NOT be \"splash free, splash less or color safe.\"   *There should not be any added fragrance to the bleach; such a lavender.    How do I make a dilute bleach bath?  *Fill your tub with lukewarm water with at least 4-6 inches of water.  *Pour 1/4 to 1/2 cup of bleach into an adult size bath tub.  *For smaller tubs (infant tubs), add two tablespoons of bleach to the tub water. * Bleach baths work better if your child is able to submerge most of their skin, so consider placing the infant tub in the larger tub.   *Repeat bleach baths as recommended by your provider.    Other information:  *Do not pour bleach directly onto the skin.  *If is safe to get the bleach mixture on your face and scalp.  *Do not drink the bleach mixture.  *Keep bleach bottle out of reach of children.              Follow-ups after your visit        Who to contact     Please call your clinic at [phone] to:    Ask questions about your health    Make or cancel appointments    Discuss your medicines    Learn about your test results    Speak to your doctor   If you have compliments or concerns about an experience at your clinic, or if you wish to file " a complaint, please contact [facility] Patient Relations at [phone] or email us at [email or web address]         Additional Information About Your Visit        [employer] Information     [employer] is an electronic gateway that provides easy, online access to your medical records. With [employer], you can request a clinic appointment, read your test results, renew a prescription or communicate with your care team.     To sign up for [employer] visit the website at [email or web address]   You will be asked to enter the access code listed below, as well as some personal information. Please follow the directions to create your username and password.     Your access code is: [email or web address]  Expires: 10/10/2017  4:09 PM     Your access code will  in 90 days. If you need help or a new code, please contact your [facility] or call [phone] for assistance.        Care EveryWhere ID     This is your Care EveryWhere ID. This could be used by other organizations to access your [place] medical records  [id number]        Your Vitals Were     Pulse Temperature Respirations Pulse Oximetry Breastfeeding? BMI (Body Mass Index)    94 98.2  F (36.8  C) (Oral) 18 99% No 27.93 kg/m2       Blood Pressure from Last 3 Encounters:   17 132/83   17 116/80   17 149/79    Weight from Last 3 Encounters:   17 164 lb (74.4 kg)   17 168 lb (76.2 kg)   17 168 lb 6.4 oz (76.4 kg)              Today, you had the following     No orders found for display         Today's Medication Changes          These changes are accurate as of: 17  4:46 PM.  If you have any questions, ask your nurse or doctor.               Start taking these medicines.        Dose/Directions    predniSONE 20 MG tablet   Commonly known as:  DELTASONE   Used for:  Dyshidrotic eczema   Started by:  Charmaine Ewing MD        Take 3 tabs (60 mg) by mouth daily x  5 days, 2 tabs (40 mg) daily x 5 days, 1 tab (20 mg) daily x 5 days, then 1/2 tab (10 mg) x 6 days.   Quantity:  33 tablet   Refills:  0            Where to get your medicines      These medications were sent to [facility] [place], [place] - [address] [facility] [address]     Phone:  [phone]     predniSONE 20 MG tablet    triamcinolone 0.5 % cream                Primary Care Provider Office Phone # Fax #    Charmaine Mary Ewing [phone]       [facility] [address]  [facility] [address]        Equal Access to Services     TROY BLOUNT : Hadii dakota Carias, waaxda arley, qaybta kaalmada robin, tereza epstein. So [facility] [phone].    ATENCIÓN: Si habla español, tiene a raygoza disposición servicios gratuitos de asistencia lingüística. Llame al [phone].    We comply with applicable federal civil rights laws and [place] laws. We do not discriminate on the basis of race, color, national origin, age, disability sex, sexual orientation or gender identity.            Thank you!     Thank you for choosing [facility]  for your care. Our goal is always to provide you with excellent care. Hearing back from our patients is one way we can continue to improve our services. Please take a few minutes to complete the written survey that you may receive in the mail after your visit with us. Thank you!             Your Updated Medication List - Protect others around you: Learn how to safely use, store and throw away your medicines at www.disposemymeds.org.          This list is accurate as of: 8/11/17  4:46 PM.  Always use your most recent med list.                   Brand Name Dispense Instructions for use Diagnosis    calamine lotion     240 mL    Apply topically 3 times daily    Spongiotic psoriasiform dermatitis       clotrimazole 1 % cream    LOTRIMIN    15 g    Apply topically 2  times daily    Tinea pedis of both feet       hydrOXYzine 25 MG tablet    ATARAX    60 tablet    Take 1-2 tablets (25-50 mg) by mouth 2 times daily as needed for itching    Itching       order for DME     1 Units    Patient needs Cane for gait abnormality due to feet and hip pain.    Hip pain, bilateral, Functional gait abnormality       predniSONE 20 MG tablet    DELTASONE    33 tablet    Take 3 tabs (60 mg) by mouth daily x 5 days, 2 tabs (40 mg) daily x 5 days, 1 tab (20 mg) daily x 5 days, then 1/2 tab (10 mg) x 6 days.    Dyshidrotic eczema       SEROQUEL 25 MG tablet   Generic drug:  QUEtiapine      Take 50 mg by mouth At Bedtime.        triamcinolone 0.5 % cream    KENALOG    60 g    Apply sparingly to affected area three times daily.    Spongiotic psoriasiform dermatitis

## 2017-08-11 NOTE — PATIENT INSTRUCTIONS
Here is the plan from today's visit    1. Dyshidrotic eczema  - predniSONE (DELTASONE) 20 MG tablet; Take 3 tabs (60 mg) by mouth daily x 5 days, 2 tabs (40 mg) daily x 5 days, 1 tab (20 mg) daily x 5 days, then 1/2 tab (10 mg) x 6 days.  Dispense: 33 tablet; Refill: 0    2. Spongiotic psoriasiform dermatitis  - triamcinolone (KENALOG) 0.5 % cream; Apply sparingly to affected area three times daily.  Dispense: 60 g; Refill: 1    You will need to do the Bleach Baths at least 3 times a week, and foot and hand bleach baths everyday. After the baths, put the Eucerin cream on your skin. Use the hydroxyzine as needed for itching.     Follow up plan  Please make a clinic appointment for follow up with me (GUTIERREZ CHRISTOPHER) in 3-4  weeks for follow up.    Thank you for coming to [facility] today.  Lab Testing:  **If you had lab testing today and your results are reassuring or normal they will be mailed to you or sent through [employer] within 7 days.   **If the lab tests need quick action we will call you with the results.  The phone number we will call with results is # [phone] (home) . If this is not the best number please call our clinic and change the number.  Medication Refills:  If you need any refills please call your pharmacy and they will contact us.   If you need to  your refill at a new pharmacy, please contact the new pharmacy directly. The new pharmacy will help you get your medications transferred faster.   Scheduling:  If you have any concerns about today's visit or wish to schedule another appointment please call our office during normal business hours [phone] (8-5:00 M-F)    Medical Concerns:  If you have urgent medical concerns please call [phone] at any time of the day.  If you have a medical emergency please call 911.  Pediatric Dermatology       Bleach Bath Instructions  What are dilute bleach baths?  Dilute bleach baths are used to help fight bacteria that is commonly found  "on the skin; this bacteria may be preventing your skin from healing. If is also used to calm inflammation in skin, even if infection is not present. The dilution ratio we recommend is the same concentration that is in a swimming pool.     Type;  *Regular, plain household bleach used for cleaning clothing. Brand or Generic is okay.   *Make sure this is plain or concentrated bleach. This should NOT be \"splash free, splash less or color safe.\"   *There should not be any added fragrance to the bleach; such a lavender.    How do I make a dilute bleach bath?  *Fill your tub with lukewarm water with at least 4-6 inches of water.  *Pour 1/4 to 1/2 cup of bleach into an adult size bath tub.  *For smaller tubs (infant tubs), add two tablespoons of bleach to the tub water. * Bleach baths work better if your child is able to submerge most of their skin, so consider placing the infant tub in the larger tub.   *Repeat bleach baths as recommended by your provider.    Other information:  *Do not pour bleach directly onto the skin.  *If is safe to get the bleach mixture on your face and scalp.  *Do not drink the bleach mixture.  *Keep bleach bottle out of reach of children.      "
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
## 2017-08-14 ENCOUNTER — DOCUMENTATION ONLY (OUTPATIENT)
Dept: FAMILY MEDICINE | Facility: CLINIC | Age: 67
End: 2017-08-14

## 2017-08-14 NOTE — PROGRESS NOTES
"When opening a documentation only encounter, be sure to enter in \"Chief Complaint\" Forms and in \" Comments\" Title of form, description if needed.    Form received via: Fax  Form now resides in: Provider Ready    Form sent out via: Fax  Patient informed: No  Output date: 8/11/17    Form received by recipient via fax confirmation  Please close the encounter.    "

## 2017-08-15 NOTE — PROGRESS NOTES
Preceptor Attestation:   Patient seen and discussed with the resident. Assessment and plan reviewed with resident and agreed upon.   Supervising Physician:  Katlyn Campbell MD  [facility]

## 2017-09-12 ENCOUNTER — OFFICE VISIT (OUTPATIENT)
Dept: FAMILY MEDICINE | Facility: CLINIC | Age: 67
End: 2017-09-12

## 2017-09-12 VITALS
OXYGEN SATURATION: 98 % | DIASTOLIC BLOOD PRESSURE: 149 MMHG | HEIGHT: 64 IN | BODY MASS INDEX: 28.34 KG/M2 | SYSTOLIC BLOOD PRESSURE: 177 MMHG | TEMPERATURE: 97.9 F | WEIGHT: 166 LBS | HEART RATE: 106 BPM

## 2017-09-12 DIAGNOSIS — L30.8 SPONGIOTIC PSORIASIFORM DERMATITIS: ICD-10-CM

## 2017-09-12 DIAGNOSIS — L30.1 DYSHIDROTIC ECZEMA: Primary | ICD-10-CM

## 2017-09-12 RX ORDER — TACROLIMUS 1 MG/G
OINTMENT TOPICAL 2 TIMES DAILY
Qty: 60 G | Refills: 0 | Status: SHIPPED | OUTPATIENT
Start: 2017-09-12 | End: 2017-10-20

## 2017-09-12 RX ORDER — PREDNISONE 20 MG/1
TABLET ORAL
Qty: 21 TABLET | Refills: 0 | Status: SHIPPED | OUTPATIENT
Start: 2017-09-12 | End: 2019-11-21

## 2017-09-12 RX ORDER — CLOBETASOL PROPIONATE 0.5 MG/G
CREAM TOPICAL
Qty: 30 G | Refills: 0 | Status: CANCELLED | OUTPATIENT
Start: 2017-09-12

## 2017-09-12 NOTE — MR AVS SNAPSHOT
After Visit Summary   9/12/2017    Kimberly Laguna    MRN: [id number]           Patient Information     Date Of Birth          1950        Visit Information        Provider Department      9/12/2017 3:20 PM Charmaine Ewing MD [facility]        Today's Diagnoses     Dyshidrotic eczema    -  1    Spongiotic psoriasiform dermatitis          Care Instructions    Here is the plan from today's visit    1. Spongiotic psoriasiform dermatitis  2. Dyshidrotic eczema  - tacrolimus (PROTOPIC) 0.1 % ointment; Apply topically 2 times daily  Dispense: 60 g; Refill: 0  - DERMATOLOGY REFERRAL - INTERNAL  - predniSONE (DELTASONE) 20 MG tablet; Take 40mg daily for 7 days, then 30mg for 7 days, then 20 mg for 7 days, then 10 mg for 7 days, then stop.  Dispense: 21 tablet; Refill: 0  - stop using the topical triamcinolone (KENALOG) 0.5 % cream and clotrimazole (LOTRIMIN) 1 % cream and start using the tacrolimus topical cream.   - wrap your hands and feet after you put the tacrolimus on.     Please call or return to clinic if your symptoms don't go away.    Follow up plan  Follow up if you are not improving in the next 4-6 weeks.    Thank you for coming to [facility] today.  Lab Testing:  **If you had lab testing today and your results are reassuring or normal they will be mailed to you or sent through [employer] within 7 days.   **If the lab tests need quick action we will call you with the results.  The phone number we will call with results is # [phone] (home) . If this is not the best number please call our clinic and change the number.  Medication Refills:  If you need any refills please call your pharmacy and they will contact us.   If you need to  your refill at a new pharmacy, please contact the new pharmacy directly. The new pharmacy will help you get your medications transferred faster.   Scheduling:  If you have any concerns about today's visit or wish to  schedule another appointment please call our office during normal business hours [phone] (8-5:00 M-F)    Medical Concerns:  If you have urgent medical concerns please call [phone] at any time of the day.  If you have a medical emergency please call 911.            Follow-ups after your visit        Additional Services     DERMATOLOGY REFERRAL - INTERNAL       Your provider has referred you to: [facility]: Dermatology Clinic [facility] [phone]   [email or web address]    Please be aware that coverage of these services is subject to the terms and limitations of your health insurance plan.  Call member services at your health plan with any benefit or coverage questions.      Please bring the following with you to your appointment:    (1) Any X-Rays, CTs or MRIs which have been performed.  Contact the facility where they were done to arrange for  prior to your scheduled appointment.    (2) List of current medications  (3) This referral request   (4) Any documents/labs given to you for this referral                  Who to contact     Please call your clinic at [phone] to:    Ask questions about your health    Make or cancel appointments    Discuss your medicines    Learn about your test results    Speak to your doctor   If you have compliments or concerns about an experience at your clinic, or if you wish to file a complaint, please contact [facility] Patient Relations at [phone] or email us at [email or web address]         Additional Information About Your Visit        MyChart Information     Frontot is an electronic gateway that provides easy, online access to your medical records. With Wetradetogether, you can request a clinic appointment, read your test results, renew a prescription or communicate with your care team.     To sign up for Frontot visit the website at [email or web address]   You will be asked to enter the  "access code listed below, as well as some personal information. Please follow the directions to create your username and password.     Your access code is: [email or web address]  Expires: 10/10/2017  4:09 PM     Your access code will  in 90 days. If you need help or a new code, please contact your [facility] or call [phone] for assistance.        Care EveryWhere ID     This is your Care EveryWhere ID. This could be used by other organizations to access your [place] medical records  [id number]        Your Vitals Were     Pulse Temperature Height Pulse Oximetry BMI (Body Mass Index)       106 97.9  F (36.6  C) (Oral) 5' 4.25\" (163.2 cm) 98% 28.27 kg/m2        Blood Pressure from Last 3 Encounters:   17 (!) 177/149   17 132/83   17 116/80    Weight from Last 3 Encounters:   17 166 lb (75.3 kg)   17 164 lb (74.4 kg)   17 168 lb (76.2 kg)              We Performed the Following     DERMATOLOGY REFERRAL - INTERNAL          Today's Medication Changes          These changes are accurate as of: 17  4:11 PM.  If you have any questions, ask your nurse or doctor.               Start taking these medicines.        Dose/Directions    tacrolimus 0.1 % ointment   Commonly known as:  PROTOPIC   Used for:  Dyshidrotic eczema, Spongiotic psoriasiform dermatitis   Started by:  Charmaine Ewing MD        Apply topically 2 times daily   Quantity:  60 g   Refills:  0         These medicines have changed or have updated prescriptions.        Dose/Directions    * predniSONE 20 MG tablet   Commonly known as:  DELTASONE   This may have changed:  Another medication with the same name was added. Make sure you understand how and when to take each.   Used for:  Dyshidrotic eczema   Changed by:  Charmaine Ewing MD        Take 3 tabs (60 mg) by mouth daily x 5 days, 2 tabs (40 mg) daily x 5 days, 1 tab (20 mg) daily x 5 days, then 1/2 tab (10 mg) x 6 days. "   Quantity:  33 tablet   Refills:  0       * predniSONE 20 MG tablet   Commonly known as:  DELTASONE   This may have changed:  You were already taking a medication with the same name, and this prescription was added. Make sure you understand how and when to take each.   Used for:  Dyshidrotic eczema   Changed by:  Charmaine Ewing MD        Take 40mg daily for 7 days, then 30mg for 7 days, then 20 mg for 7 days, then 10 mg for 7 days, then stop.   Quantity:  21 tablet   Refills:  0       * Notice:  This list has 2 medication(s) that are the same as other medications prescribed for you. Read the directions carefully, and ask your doctor or other care provider to review them with you.      Stop taking these medicines if you haven't already. Please contact your care team if you have questions.     clotrimazole 1 % cream   Commonly known as:  LOTRIMIN   Stopped by:  Charmaine Ewing MD           triamcinolone 0.5 % cream   Commonly known as:  KENALOG   Stopped by:  Charmaine Ewing MD                Where to get your medicines      These medications were sent to [facility] [address] [place], [place] - [address]  [address] [facility] [address]     Phone:  [phone]     predniSONE 20 MG tablet    tacrolimus 0.1 % ointment                Primary Care Provider Office Phone # Fax #    Charmaine Ewing [phone]       [facility] [address] [facility] [address]        Equal Access to Services     TROY BLOUNT AH: Hadii dakota ku hadasho Sohiali, waaxda luqadaha, qaybta kaalmada adeegyada, waxay collin epstein. So [facility] [phone].    ATENCIÓN: Si habla español, tiene a raygoza disposición servicios gratuitos de asistencia lingüística. Llame al [phone].    We comply with applicable federal civil rights laws and [place] laws. We do not discriminate on the basis of race, color, national origin, age, disability sex, sexual  orientation or gender identity.            Thank you!     Thank you for choosing [facility]  for your care. Our goal is always to provide you with excellent care. Hearing back from our patients is one way we can continue to improve our services. Please take a few minutes to complete the written survey that you may receive in the mail after your visit with us. Thank you!             Your Updated Medication List - Protect others around you: Learn how to safely use, store and throw away your medicines at www.disposemymeds.org.          This list is accurate as of: 9/12/17  4:11 PM.  Always use your most recent med list.                   Brand Name Dispense Instructions for use Diagnosis    calamine lotion     240 mL    Apply topically 3 times daily    Spongiotic psoriasiform dermatitis       hydrOXYzine 25 MG tablet    ATARAX    60 tablet    Take 1-2 tablets (25-50 mg) by mouth 2 times daily as needed for itching    Itching       order for DME     1 Units    Patient needs Cane for gait abnormality due to feet and hip pain.    Hip pain, bilateral, Functional gait abnormality       * predniSONE 20 MG tablet    DELTASONE    33 tablet    Take 3 tabs (60 mg) by mouth daily x 5 days, 2 tabs (40 mg) daily x 5 days, 1 tab (20 mg) daily x 5 days, then 1/2 tab (10 mg) x 6 days.    Dyshidrotic eczema       * predniSONE 20 MG tablet    DELTASONE    21 tablet    Take 40mg daily for 7 days, then 30mg for 7 days, then 20 mg for 7 days, then 10 mg for 7 days, then stop.    Dyshidrotic eczema       SEROQUEL 25 MG tablet   Generic drug:  QUEtiapine      Take 50 mg by mouth At Bedtime.        tacrolimus 0.1 % ointment    PROTOPIC    60 g    Apply topically 2 times daily    Dyshidrotic eczema, Spongiotic psoriasiform dermatitis       * Notice:  This list has 2 medication(s) that are the same as other medications prescribed for you. Read the directions carefully, and ask your doctor or other care provider to review  them with you.

## 2017-09-12 NOTE — PATIENT INSTRUCTIONS
Here is the plan from today's visit    1. Spongiotic psoriasiform dermatitis  2. Dyshidrotic eczema  - tacrolimus (PROTOPIC) 0.1 % ointment; Apply topically 2 times daily  Dispense: 60 g; Refill: 0  - DERMATOLOGY REFERRAL - INTERNAL  - predniSONE (DELTASONE) 20 MG tablet; Take 40mg daily for 7 days, then 30mg for 7 days, then 20 mg for 7 days, then 10 mg for 7 days, then stop.  Dispense: 21 tablet; Refill: 0  - stop using the topical triamcinolone (KENALOG) 0.5 % cream and clotrimazole (LOTRIMIN) 1 % cream and start using the tacrolimus topical cream.   - wrap your hands and feet after you put the tacrolimus on.     Please call or return to clinic if your symptoms don't go away.    Follow up plan  Follow up if you are not improving in the next 4-6 weeks.    Thank you for coming to [facility] today.  Lab Testing:  **If you had lab testing today and your results are reassuring or normal they will be mailed to you or sent through [employer] within 7 days.   **If the lab tests need quick action we will call you with the results.  The phone number we will call with results is # [phone] (home) . If this is not the best number please call our clinic and change the number.  Medication Refills:  If you need any refills please call your pharmacy and they will contact us.   If you need to  your refill at a new pharmacy, please contact the new pharmacy directly. The new pharmacy will help you get your medications transferred faster.   Scheduling:  If you have any concerns about today's visit or wish to schedule another appointment please call our office during normal business hours [phone] (8-5:00 M-F)    Medical Concerns:  If you have urgent medical concerns please call [phone] at any time of the day.  If you have a medical emergency please call [phone].
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
## 2017-09-12 NOTE — PROGRESS NOTES
Preceptor Attestation:   Patient seen and discussed with the resident. Assessment and plan reviewed with resident and agreed upon.   Supervising Physician:  Tiffany Sheikh MD  [facility]

## 2017-09-13 ENCOUNTER — TELEPHONE (OUTPATIENT)
Dept: DERMATOLOGY | Facility: CLINIC | Age: 67
End: 2017-09-13

## 2017-09-13 NOTE — TELEPHONE ENCOUNTER
----- Message from Umm Conrad sent at 9/12/2017  5:28 PM CDT -----  Regarding: [facility]erral-High Priority-Please Advise On Appt Options  [facility] is referring this patient for:    Dyshidrotic eczema  Spongiotic psoriasiform dermatitis    Please advise on appointment options as they are indicating it's Urgent.    Thanks,  Umm

## 2017-09-13 NOTE — TELEPHONE ENCOUNTER
"Spoke with patient who indicates she does Not want to be seen \"sooner than 3 weeks.\" States she was just started on a new medication that is already helping. Writer explained process of getting a sooner appt. Patient was transferred to scheduling since at this time she does not want to be seen until she finds out if this medication will help. She agrees to contact clinic if requesting sooner appt. No further questions at this time.  "

## 2017-09-14 ASSESSMENT — ENCOUNTER SYMPTOMS
DYSURIA: 0
PALPITATIONS: 0
SHORTNESS OF BREATH: 0
WHEEZING: 0
FEVER: 0
MYALGIAS: 0
SLEEP DISTURBANCE: 1
NAUSEA: 0
AGITATION: 1
CHILLS: 0
HEADACHES: 0
ARTHRALGIAS: 1
FATIGUE: 0
DIZZINESS: 0
DYSPHORIC MOOD: 1
SORE THROAT: 0
NERVOUS/ANXIOUS: 1
ABDOMINAL PAIN: 0
HEMATURIA: 0

## 2017-09-15 DIAGNOSIS — L30.8 SPONGIOTIC PSORIASIFORM DERMATITIS: ICD-10-CM

## 2017-09-15 DIAGNOSIS — L30.1 DYSHIDROTIC ECZEMA: ICD-10-CM

## 2017-09-15 RX ORDER — TACROLIMUS 1 MG/G
OINTMENT TOPICAL
Qty: 60 G | Refills: 0 | Status: CANCELLED | OUTPATIENT
Start: 2017-09-15

## 2017-09-15 NOTE — TELEPHONE ENCOUNTER
[facility] phone call message- patient requesting a refill:    Full Medication Name: Tacrolimus cream    Dose:     Pharmacy confirmed as     [id number] - [place], [place] - [address]  [address] [address]  Phone: [phone] Fax: [phone]  : Yes    Additional Comments: Please refill she feels she may run out by monday     OK to leave a message on voice mail? Yes    Primary language: English      needed? No    Call taken on September 15, 2017 at 3:21 PM by Kasandra Curtis

## 2017-10-20 ENCOUNTER — TELEPHONE (OUTPATIENT)
Dept: FAMILY MEDICINE | Facility: CLINIC | Age: 67
End: 2017-10-20

## 2017-10-20 DIAGNOSIS — B00.9 HSV (HERPES SIMPLEX VIRUS) INFECTION: Primary | ICD-10-CM

## 2017-10-20 RX ORDER — TACROLIMUS 1 MG/G
OINTMENT TOPICAL 2 TIMES DAILY
Qty: 60 G | Refills: 0 | Status: SHIPPED | OUTPATIENT
Start: 2017-10-20 | End: 2019-11-21

## 2017-10-20 NOTE — TELEPHONE ENCOUNTER
Original request never routed to PCP. Message routed to PCP to refill if appropriate.    Vivian Clarke RN

## 2017-10-20 NOTE — TELEPHONE ENCOUNTER
Patient calling to check on status of medication refill.  States they are almost out.  Routing message to nurse high-priority due to request date.

## 2017-10-23 RX ORDER — ACYCLOVIR 400 MG/1
400 TABLET ORAL 3 TIMES DAILY
Qty: 15 TABLET | Refills: 5 | Status: SHIPPED | OUTPATIENT
Start: 2017-10-23 | End: 2019-11-21

## 2017-10-24 DIAGNOSIS — B00.9 HSV (HERPES SIMPLEX VIRUS) INFECTION: ICD-10-CM

## 2017-10-24 RX ORDER — ACYCLOVIR 400 MG/1
400 TABLET ORAL 3 TIMES DAILY
Qty: 15 TABLET | Refills: 5 | OUTPATIENT
Start: 2017-10-24

## 2017-10-24 NOTE — TELEPHONE ENCOUNTER
Request for medication refill: Acyclovir 400mg    Date of last visit at clinic: 09/12/2017    Please complete refill if appropriate and CLOSE ENCOUNTER.    Closing the encounter signifies the refill is complete.    If refill has been denied, please complete the smart phrase .smirefuse and route it to the [facility] RN TRIAGE pool to inform the patient and the pharmacy.    Iram Skaggs, CMA

## 2017-10-24 NOTE — TELEPHONE ENCOUNTER
Received duplicate request for acyclovir. Refused current request as I sent script for same medication 10/23 with 5 refills. If script is not available, please contact me.   Charmaine Ewing MD  [facility]  [phone]

## 2018-02-13 DIAGNOSIS — L30.8 SPONGIOTIC PSORIASIFORM DERMATITIS: ICD-10-CM

## 2018-02-13 RX ORDER — TRIAMCINOLONE ACETONIDE 5 MG/G
CREAM TOPICAL
Qty: 60 G | Refills: 1 | Status: SHIPPED | OUTPATIENT
Start: 2018-02-13 | End: 2018-06-28

## 2018-02-13 NOTE — TELEPHONE ENCOUNTER
Request for medication refill:    Triamcinolone 0.5% cream     Date of last visit at clinic: 09/12/20108    Please complete refill if appropriate and CLOSE ENCOUNTER.    Closing the encounter signifies the refill is complete.    If refill has been denied, please complete the smart phrase .smirefuse and route it to the [facility] RN TRIAGE pool to inform the patient and the pharmacy.    Iram Skaggs, CMA

## 2018-06-28 DIAGNOSIS — L30.8 SPONGIOTIC PSORIASIFORM DERMATITIS: ICD-10-CM

## 2018-06-28 RX ORDER — TRIAMCINOLONE ACETONIDE 5 MG/G
CREAM TOPICAL
Qty: 60 G | Refills: 1 | Status: SHIPPED | OUTPATIENT
Start: 2018-06-28 | End: 2019-05-08

## 2018-06-28 NOTE — TELEPHONE ENCOUNTER

## 2019-05-07 DIAGNOSIS — L30.8 SPONGIOTIC PSORIASIFORM DERMATITIS: ICD-10-CM

## 2019-05-07 NOTE — TELEPHONE ENCOUNTER

## 2019-05-08 RX ORDER — TRIAMCINOLONE ACETONIDE 5 MG/G
CREAM TOPICAL
Qty: 60 G | Refills: 1 | Status: SHIPPED | OUTPATIENT
Start: 2019-05-08 | End: 2020-06-03

## 2019-11-21 ENCOUNTER — OFFICE VISIT (OUTPATIENT)
Dept: FAMILY MEDICINE | Facility: CLINIC | Age: 69
End: 2019-11-21
Payer: MEDICARE

## 2019-11-21 VITALS
DIASTOLIC BLOOD PRESSURE: 80 MMHG | TEMPERATURE: 98.4 F | OXYGEN SATURATION: 97 % | HEART RATE: 90 BPM | SYSTOLIC BLOOD PRESSURE: 138 MMHG | WEIGHT: 172.8 LBS | RESPIRATION RATE: 16 BRPM | HEIGHT: 64 IN | BODY MASS INDEX: 29.5 KG/M2

## 2019-11-21 DIAGNOSIS — F31.70 BIPOLAR AFFECTIVE DISORDER IN REMISSION (H): Primary | ICD-10-CM

## 2019-11-21 DIAGNOSIS — L29.9 ITCHING: ICD-10-CM

## 2019-11-21 DIAGNOSIS — F41.9 ANXIETY: ICD-10-CM

## 2019-11-21 RX ORDER — BUPROPION HYDROCHLORIDE 100 MG/1
TABLET, EXTENDED RELEASE ORAL
Refills: 3 | COMMUNITY
Start: 2019-09-20 | End: 2019-11-21

## 2019-11-21 RX ORDER — QUETIAPINE FUMARATE 25 MG/1
25 TABLET, FILM COATED ORAL AT BEDTIME
Qty: 30 TABLET | Refills: 3 | Status: SHIPPED | OUTPATIENT
Start: 2019-11-21 | End: 2021-11-18

## 2019-11-21 RX ORDER — HYDROXYZINE HYDROCHLORIDE 25 MG/1
25-50 TABLET, FILM COATED ORAL 2 TIMES DAILY PRN
Qty: 60 TABLET | Refills: 1 | Status: SHIPPED | OUTPATIENT
Start: 2019-11-21 | End: 2020-01-28

## 2019-11-21 RX ORDER — BUPROPION HYDROCHLORIDE 100 MG/1
200 TABLET, EXTENDED RELEASE ORAL DAILY
Qty: 60 TABLET | Refills: 3 | Status: SHIPPED | OUTPATIENT
Start: 2019-11-21 | End: 2020-03-13

## 2019-11-21 ASSESSMENT — ENCOUNTER SYMPTOMS
UNEXPECTED WEIGHT CHANGE: 0
WEAKNESS: 0
ACTIVITY CHANGE: 0
SLEEP DISTURBANCE: 0
VOMITING: 0
HEADACHES: 0
COUGH: 0
TREMORS: 0
DECREASED CONCENTRATION: 0
HYPERACTIVE: 0
CHEST TIGHTNESS: 0
APPETITE CHANGE: 0
NAUSEA: 0
DIARRHEA: 0
FATIGUE: 0
NERVOUS/ANXIOUS: 1
AGITATION: 1
PALPITATIONS: 0
LIGHT-HEADEDNESS: 0
SHORTNESS OF BREATH: 0
CONSTIPATION: 0

## 2019-11-21 ASSESSMENT — MIFFLIN-ST. JEOR: SCORE: 1303.82
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
## 2019-11-21 NOTE — PROGRESS NOTES
Preceptor Attestation:   Patient seen and discussed with the resident. Call in 1 month, follow up in about 2-3 months.  Assessment and plan reviewed with resident and agreed upon.   Supervising Physician:  Taqueria Plata MD  [facility]

## 2019-11-21 NOTE — PATIENT INSTRUCTIONS
Thank you for coming to [facility].    **If you had lab testing today and your results are reassuring or normal they will be be mailed to you or sent to your MyChart and you will be notified by email within 7 days.   **If the lab tests need quick action we will call you with the results.  The phone number we will call with results is # [phone] (home)    (home) . If this is not the best number please call our clinic and change the number.  **If any referrals were ordered today you should be getting a call in the next week.  If you don't hear about this within a week please call one of the following numbers   If your referral is for [facility] please call [phone]  If your referral is to outside [facility] please call the clinic number ([phone]) and ask for your team care coordinator.  If you need any refills please call your pharmacy and they will contact us.  If you have any concerns about today's visit or wish to schedule another appointment  please call our office during normal business hours  [phone] (8-5:00 M-F)  If you have urgent medical concerns please call [phone] at any time of the day.  If you have a medical emergency please call [phone]    Again thank you for choosing [facility] and please let us know how we can best partner with you to improve your and your family's health.

## 2019-12-05 ENCOUNTER — TELEPHONE (OUTPATIENT)
Dept: FAMILY MEDICINE | Facility: CLINIC | Age: 69
End: 2019-12-05

## 2019-12-05 NOTE — TELEPHONE ENCOUNTER
Verify that the refill encounter hasn't been started Yes    [facility] phone call message- patient requesting a refill:    Full Medication Name:hydrOXYzine (ATARAX) 25 MG tablet     Dose: Sig - Route: Take 1-2 tablets (25-50 mg) by mouth 2 times daily as needed for itching - Oral     Pharmacy confirmed as   [id number] - [place], [place] - [address] [place] [id number]  Phone: [phone] Fax: [phone]  : Yes    Medication tab checked to see if medication has been sent  Yes    Additional Comments: Patient requesting the refill of the medications to 50 ML and for 3 times a day.     OK to leave a message on voice mail? Yes    Advised patient refill may take up to 2 business days? Yes    Primary language: English      needed? No    Call taken on December 5, 2019 at 4:57 PM by Barbara Garcia    Route to P [facility] MED REFILL

## 2019-12-05 NOTE — TELEPHONE ENCOUNTER
Received Rx-script for hydrOXYzine (ATARAX) 25 MG tablet, prescription was last filled on 11/21/2019 with one (1) additional refill to last till 01/21/2020.     Author called patient to clarify request, patient reported to have been taking hydrOXYzine-ATARAX) 50 MG tablet 2-4 tablets daily in the past, notified, that message will routed to provider last seen in clinic/PCP to address/advise if appropriate, Pt verbalized understanding and agreed. will continue to follow up.    Flaco Russell RN

## 2020-01-08 NOTE — TELEPHONE ENCOUNTER
Appointment scheduled to see provider in clinic  regarding long term treatment plan/dosage for hydrOXYzine-ATARAX). Per patient request.     Flaco Russell RN

## 2020-01-28 ENCOUNTER — OFFICE VISIT (OUTPATIENT)
Dept: FAMILY MEDICINE | Facility: CLINIC | Age: 70
End: 2020-01-28
Payer: MEDICARE

## 2020-01-28 VITALS
WEIGHT: 170.2 LBS | TEMPERATURE: 98.2 F | RESPIRATION RATE: 16 BRPM | BODY MASS INDEX: 29.06 KG/M2 | HEART RATE: 84 BPM | HEIGHT: 64 IN | DIASTOLIC BLOOD PRESSURE: 93 MMHG | SYSTOLIC BLOOD PRESSURE: 169 MMHG | OXYGEN SATURATION: 94 %

## 2020-01-28 DIAGNOSIS — Z23 NEED FOR SHINGLES VACCINE: ICD-10-CM

## 2020-01-28 DIAGNOSIS — L29.9 ITCHING: ICD-10-CM

## 2020-01-28 DIAGNOSIS — Z51.81 ENCOUNTER FOR THERAPEUTIC DRUG MONITORING: Primary | ICD-10-CM

## 2020-01-28 LAB
ALBUMIN SERPL-MCNC: 3.5 G/DL (ref 3.4–5)
ALP SERPL-CCNC: 99 U/L (ref 40–150)
ALT SERPL W P-5'-P-CCNC: 14 U/L (ref 0–50)
ANION GAP SERPL CALCULATED.3IONS-SCNC: 5 MMOL/L (ref 3–14)
AST SERPL W P-5'-P-CCNC: 16 U/L (ref 0–45)
BILIRUB SERPL-MCNC: 0.3 MG/DL (ref 0.2–1.3)
BUN SERPL-MCNC: 16 MG/DL (ref 7–30)
CALCIUM SERPL-MCNC: 9.9 MG/DL (ref 8.5–10.1)
CHLORIDE SERPL-SCNC: 104 MMOL/L (ref 94–109)
CHOLEST SERPL-MCNC: 386 MG/DL
CO2 SERPL-SCNC: 30 MMOL/L (ref 20–32)
CREAT SERPL-MCNC: 0.95 MG/DL (ref 0.52–1.04)
GFR SERPL CREATININE-BSD FRML MDRD: 61 ML/MIN/{1.73_M2}
GLUCOSE SERPL-MCNC: 98 MG/DL (ref 70–99)
HBA1C MFR BLD: 5.5 % (ref 4.1–5.7)
HDLC SERPL-MCNC: 40 MG/DL
LDLC SERPL CALC-MCNC: 318 MG/DL
NONHDLC SERPL-MCNC: 346 MG/DL
POTASSIUM SERPL-SCNC: 3.8 MMOL/L (ref 3.4–5.3)
PROT SERPL-MCNC: 7.6 G/DL (ref 6.8–8.8)
SODIUM SERPL-SCNC: 139 MMOL/L (ref 133–144)
TRIGL SERPL-MCNC: 141 MG/DL
TSH SERPL DL<=0.005 MIU/L-ACNC: 1.71 MU/L (ref 0.4–4)

## 2020-01-28 RX ORDER — HYDROXYZINE HYDROCHLORIDE 25 MG/1
50 TABLET, FILM COATED ORAL 4 TIMES DAILY
Qty: 720 TABLET | Refills: 3 | Status: SHIPPED | OUTPATIENT
Start: 2020-01-28 | End: 2020-09-17

## 2020-01-28 ASSESSMENT — PAIN SCALES - GENERAL: PAINLEVEL: NO PAIN (0)

## 2020-01-28 ASSESSMENT — MIFFLIN-ST. JEOR: SCORE: 1289.77
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
## 2020-01-28 NOTE — LETTER
January 29, 2020      Kimberly Laguna  [address]  [place] [address]        Dear Kimberly,    Thank you for getting your care at [facility]. Please see below for your test results. As we discussed your Cholesterol is high and I have sent the script to your pharmacy.  Your other results are reassuring.    Resulted Orders   TSH with free T4 reflex   Result Value Ref Range    TSH 1.71 0.40 - 4.00 mU/L   Hemoglobin A1c ([facility])   Result Value Ref Range    Hemoglobin A1C 5.5 4.1 - 5.7 %   Comprehensive metabolic panel   Result Value Ref Range    Sodium 139 133 - 144 mmol/L    Potassium 3.8 3.4 - 5.3 mmol/L    Chloride 104 94 - 109 mmol/L    Carbon Dioxide 30 20 - 32 mmol/L    Anion Gap 5 3 - 14 mmol/L    Glucose 98 70 - 99 mg/dL    Urea Nitrogen 16 7 - 30 mg/dL    Creatinine 0.95 0.52 - 1.04 mg/dL    GFR Estimate 61 >60 mL/min/[1.73_m2]      Comment:      Non  GFR Calc  Starting 12/18/2018, serum creatinine based estimated GFR (eGFR) will be   calculated using the Chronic Kidney Disease Epidemiology Collaboration   (CKD-EPI) equation.      GFR Estimate If Black 71 >60 mL/min/[1.73_m2]      Comment:       GFR Calc  Starting 12/18/2018, serum creatinine based estimated GFR (eGFR) will be   calculated using the Chronic Kidney Disease Epidemiology Collaboration   (CKD-EPI) equation.      Calcium 9.9 8.5 - 10.1 mg/dL    Bilirubin Total 0.3 0.2 - 1.3 mg/dL    Albumin 3.5 3.4 - 5.0 g/dL    Protein Total 7.6 6.8 - 8.8 g/dL    Alkaline Phosphatase 99 40 - 150 U/L    ALT 14 0 - 50 U/L    AST 16 0 - 45 U/L   Lipid panel reflex to direct LDL Non-fasting   Result Value Ref Range    Cholesterol 386 (H) <200 mg/dL      Comment:      Desirable:       <200 mg/dl    Triglycerides 141 <150 mg/dL    HDL Cholesterol 40 (L) >49 mg/dL    LDL Cholesterol Calculated 318 (H) <100 mg/dL      Comment:      Above desirable:  100-129 mg/dl  Borderline High:  130-159 mg/dL  High:              160-189 mg/dL  Very high:       >189 mg/dl      Non HDL Cholesterol 346 (H) <130 mg/dL      Comment:      Above Desirable:  130-159 mg/dl  Borderline high:  160-189 mg/dl  High:             190-219 mg/dl  Very high:       >219 mg/dl         If you have any concerns about these results please call and leave a message for me or send a MyChart message to the clinic.    Sincerely,    Corey Brooks MD

## 2020-01-29 ENCOUNTER — TELEPHONE (OUTPATIENT)
Dept: FAMILY MEDICINE | Facility: CLINIC | Age: 70
End: 2020-01-29

## 2020-01-29 DIAGNOSIS — E78.2 MIXED HYPERLIPIDEMIA: Primary | ICD-10-CM

## 2020-01-29 RX ORDER — ATORVASTATIN CALCIUM 40 MG/1
40 TABLET, FILM COATED ORAL DAILY
Qty: 90 TABLET | Refills: 3 | Status: SHIPPED | OUTPATIENT
Start: 2020-01-29 | End: 2020-04-28

## 2020-01-29 NOTE — TELEPHONE ENCOUNTER
Called patient about lipids   Plan agreed to start atorvaxtatin  Discussed risks and benefits   Follow up in 3-6 weeks for BP and lipid check  Giovanni Brooks

## 2020-03-12 DIAGNOSIS — F31.70 BIPOLAR AFFECTIVE DISORDER IN REMISSION (H): ICD-10-CM

## 2020-03-13 RX ORDER — BUPROPION HYDROCHLORIDE 100 MG/1
200 TABLET, EXTENDED RELEASE ORAL DAILY
Qty: 180 TABLET | Refills: 1 | Status: SHIPPED | OUTPATIENT
Start: 2020-03-13 | End: 2020-03-17

## 2020-03-13 NOTE — TELEPHONE ENCOUNTER

## 2020-03-17 DIAGNOSIS — F31.70 BIPOLAR AFFECTIVE DISORDER IN REMISSION (H): ICD-10-CM

## 2020-03-17 RX ORDER — BUPROPION HYDROCHLORIDE 100 MG/1
200 TABLET, EXTENDED RELEASE ORAL DAILY
Qty: 180 TABLET | Refills: 1 | Status: SHIPPED | OUTPATIENT
Start: 2020-03-17 | End: 2020-12-17

## 2020-03-17 NOTE — TELEPHONE ENCOUNTER
"Request for medication refill: Bupropion     Providers if patient needs an appointment and you are willing to give a one month supply please refill for one month and  send a letter/MyChart using \".SMILLIMITEDREFILL\" .smillimited and route chart to \"P SMI \" (Giving one month refill in non controlled medications is strongly recommended before denial)    If refill has been denied, meaning absolutely no refills without visit, please complete the smart phrase \".smirxrefuse\" and route it to the \"P SMI MED REFILLS\"  pool to inform the patient and the pharmacy.    Iram Skaggs, CMA        "

## 2020-04-28 ENCOUNTER — VIRTUAL VISIT (OUTPATIENT)
Dept: FAMILY MEDICINE | Facility: CLINIC | Age: 70
End: 2020-04-28
Payer: MEDICARE

## 2020-04-28 DIAGNOSIS — E78.2 MIXED HYPERLIPIDEMIA: ICD-10-CM

## 2020-04-28 DIAGNOSIS — G57.01 PIRIFORMIS SYNDROME OF RIGHT SIDE: Primary | ICD-10-CM

## 2020-04-28 RX ORDER — ATORVASTATIN CALCIUM 40 MG/1
40 TABLET, FILM COATED ORAL DAILY
Qty: 90 TABLET | Refills: 3 | Status: SHIPPED | OUTPATIENT
Start: 2020-04-28 | End: 2022-02-21

## 2020-04-28 RX ORDER — CELECOXIB 100 MG/1
100 CAPSULE ORAL 2 TIMES DAILY
Qty: 60 CAPSULE | Refills: 0 | Status: SHIPPED | OUTPATIENT
Start: 2020-04-28 | End: 2020-07-01

## 2020-04-28 NOTE — PATIENT INSTRUCTIONS
Here is the plan from today's visit    1. Piriformis syndrome of right side  As you are diagnosed I think this is the most likely cause of your pain no sciatica is also a possibility.  I do believe that therapy time and ice are the best things for this pain.  I do concur with your plan to bike regularly and I hope that will help.  If you are not better come back for a visit and we will see if we can help you in person.  Also if you are having any bowel or bladder incontinence during 1 of these episodes please do call or go to the emergency room as this may be a neurologic emergency  - celecoxib (CELEBREX) 100 MG capsule; Take 1 capsule (100 mg) by mouth 2 times daily  Dispense: 60 capsule; Refill: 0    2. Mixed hyperlipidemia  Sorry did not receive the medication before I hope you will receive at this time as you know this these medications have been shown to  the chance of heart disease and stroke by about 25%  - atorvastatin (LIPITOR) 40 MG tablet; Take 1 tablet (40 mg) by mouth daily  Dispense: 90 tablet; Refill: 3      Please call or return to clinic if your symptoms don't go away.    Follow up plan  Return in about 2 weeks (around 5/12/2020), or if symptoms worsen or fail to improve, for Piriformis syndrome.     Thank you for coming to [facility] today.  Lab Testing:  **If you had lab testing today and your results are reassuring or normal they will be mailed to you or sent through [employer] within 7 days.   **If the lab tests need quick action we will call you with the results.  The phone number we will call with results is # [phone] (home) . If this is not the best number please call our clinic and change the number.  Medication Refills:  If you need any refills please call your pharmacy and they will contact us.   If you need to  your refill at a new pharmacy, please contact the new pharmacy directly. The new pharmacy will help you get your medications transferred faster.   Scheduling:  If you  have any concerns about today's visit or wish to schedule another appointment please call our office during normal business hours [phone] (8-5:00 M-F)   eferrals to [facility] please call [phone].   Mammogram Scheduling [phone]     XRay/CT/Ultrasound/MRI Scheduling [phone]    Medical Concerns:  If you have urgent medical concerns please call [phone] at any time of the day.    Corey Brooks MD

## 2020-04-28 NOTE — PROGRESS NOTES
"Family Medicine Telephone Visit Note               Telephone Visit Consent   Patient was verbally read the following and verbal consent was obtained.    \"This telephone visit will be conducted via a call between you and your physician/provider. We have found that certain health care needs can be provided without the need for a physical exam.  This service lets us provide the care you need with a short phone conversation.  If a prescription is necessary we can send it directly to your pharmacy.  If lab work is needed we can place an order for that and you can then stop by our lab to have the test done at a later time.    Telephone visits are billed at different rates depending on your insurance coverage. During this emergency period, for some insurers they may be billed the same as an in-person visit.  Please reach out to your insurance provider with any questions.    If during the course of the call the physician/provider feels a telephone visit is not appropriate, you will not be charged for this service.\"    Name person giving consent:  Patient   Date verbal consent given:  4/28/2020  Time verbal consent given:  3:35 PM           Chief Complaint   Patient presents with     Back Pain     sciatica pain and neck pain    Piriformis pain           HPI   Patients name: Kimberly  Appointment start time:  4:12 PM    Severe pain in buttocks and   Shooting down pain into the right side, unable to walk more than a few hundred feet   Also neck and shoulder pain, Had similar pain 3 years ago. Then it got better with physical therapy.  Having trouble getting to a bike. Pain comes and goes. Denies bowel or bladder dysfunction and pain is a little better than it was previously      Current Outpatient Medications   Medication Sig Dispense Refill     buPROPion (WELLBUTRIN SR) 100 MG 12 hr tablet Take 2 tablets (200 mg) by mouth daily 180 tablet 1     hydrOXYzine (ATARAX) 25 MG tablet Take 2 tablets (50 mg) by mouth 4 times daily 720 " "tablet 3     QUEtiapine (SEROQUEL) 25 MG tablet Take 1 tablet (25 mg) by mouth At Bedtime 30 tablet 3     triamcinolone (ARISTOCORT HP) 0.5 % external cream Apply sparingly to affected area three times daily. 60 g 1     atorvastatin (LIPITOR) 40 MG tablet Take 1 tablet (40 mg) by mouth daily (Patient not taking: Reported on 4/28/2020) 90 tablet 3     order for DME Patient needs Cane for gait abnormality due to feet and hip pain. 1 Units 0     Allergies   Allergen Reactions     Tylenol Swelling     Swelling in eyes              Review of Systems:     ROS COMP: Constitutional, HEENT, cardiovascular, pulmonary, gi and gu systems are negative, except as otherwise noted.         Physical Exam:     There were no vitals taken for this visit.  Estimated body mass index is 29.06 kg/m  as calculated from the following:    Height as of 1/28/20: 1.63 m (5' 4.17\").    Weight as of 1/28/20: 77.2 kg (170 lb 3.2 oz).    Exam:  Constitutional: healthy, alert and no distress  Psychiatric: mentation appears normal and affect normal/bright            Assessment and Plan   1. Piriformis syndrome of right side  This is likely cause of her pain this versus sciatica.  Patient reports that she done some physical therapy previously and will try to repeat exercises that she got from the physical therapist 3 years ago cautioned her that if she has any loss of bowel or bladder function that is an emergency or call back if she is not improving.  - celecoxib (CELEBREX) 100 MG capsule; Take 1 capsule (100 mg) by mouth 2 times daily  Dispense: 60 capsule; Refill: 0    2. Mixed hyperlipidemia  Patient did not receive  their atorvastatin at the last visit so I did refill this and we reviewed importance to reduce risk of heart disease and strokes.  - atorvastatin (LIPITOR) 40 MG tablet; Take 1 tablet (40 mg) by mouth daily  Dispense: 90 tablet; Refill: 3    Refilled medications that would be required in the next 3 months.     After Visit " Information:  Will print and mail AVS     No follow-ups on file.    Appointment end time: 4:33 PM  This is a telephone visit that took 23 minutes.      Clinician location:  [facility]    Corey Brooks MD

## 2020-06-03 DIAGNOSIS — L30.8 SPONGIOTIC PSORIASIFORM DERMATITIS: ICD-10-CM

## 2020-06-03 RX ORDER — TRIAMCINOLONE ACETONIDE 5 MG/G
CREAM TOPICAL
Qty: 60 G | Refills: 1 | Status: SHIPPED | OUTPATIENT
Start: 2020-06-03 | End: 2021-07-16

## 2020-06-03 NOTE — TELEPHONE ENCOUNTER
Drug: triamcinolone 0.5% Cream  Last Fill Date: 09/18/2018  Last Fill Quantity: 15 grams  Last Office Visit: 01/28/2020    Thanks,  Blanche Cheek [facility]

## 2020-06-30 DIAGNOSIS — G57.01 PIRIFORMIS SYNDROME OF RIGHT SIDE: ICD-10-CM

## 2020-06-30 NOTE — TELEPHONE ENCOUNTER

## 2020-07-01 RX ORDER — CELECOXIB 100 MG/1
100 CAPSULE ORAL 2 TIMES DAILY
Qty: 60 CAPSULE | Refills: 0 | Status: SHIPPED | OUTPATIENT
Start: 2020-07-01 | End: 2020-08-04

## 2020-07-09 ENCOUNTER — DOCUMENTATION ONLY (OUTPATIENT)
Dept: FAMILY MEDICINE | Facility: CLINIC | Age: 70
End: 2020-07-09

## 2020-07-09 NOTE — PROGRESS NOTES
"Telephone call to the client's home for annual health risk assessment.  An  was not needed.    Current situation/living environment  Completed health risk assess via phone, she lives alone in private home and reports that she manages her affairs independently      Activities of daily living (ADL)/instrumental activities of daily living (IADL) and functional issues  Client needs help with the following ADL's: NA, indep  Client needs help with the following IADL's: NA, indep  Client states she is unable to perform the above due to reports indep with all affairs      Health concerns for today  Reports that pain is greatly improved since starting Celexa, is not taking Lipitor yet as she is worried about her liver due to reported history of hepatitis. Encouraged her to make wellness appt and discuss, as well as update preventive cares.  Has patient fallen 2 or more times in the last year? No  Has patient fallen with injury in the last year? No    Cognition/mental health  A&Ox3, directs her own care. Takes medication for bipolar and anxiety, reports long history of using \"black market\" drugs since 1960's, she is not interested in chemical dependency info/resources.    STARS/Med Adherence  Client is non-compliant with the following quality measures: Breast cancer screening  Comments: education efforts    Client's Plan of Care consists of:  Transportation, rides to medical appts are available if needed through [facility] - but she reports being indep driving. She plans to remain indep with her affairs at this time.     Francisca Caro RN  [facility] Care Coordinator  [phone] or [phone]  "

## 2020-07-15 ENCOUNTER — DOCUMENTATION ONLY (OUTPATIENT)
Dept: FAMILY MEDICINE | Facility: CLINIC | Age: 70
End: 2020-07-15

## 2020-07-15 NOTE — PROGRESS NOTES
"When opening a documentation only encounter, be sure to enter in \"Chief Complaint\" Forms and in \" Comments\" Title of form, description if needed.    Kimberly is a 69 year old  female  Form received via: Fax  Form now resides in: Provider Ready    Shaila Ray MA                  "

## 2020-07-22 ENCOUNTER — MEDICAL CORRESPONDENCE (OUTPATIENT)
Dept: HEALTH INFORMATION MANAGEMENT | Facility: CLINIC | Age: 70
End: 2020-07-22

## 2020-07-22 PROBLEM — I10 BENIGN ESSENTIAL HYPERTENSION: Status: ACTIVE | Noted: 2020-07-22

## 2020-07-23 NOTE — PROGRESS NOTES
Form has been completed by provider.     Form sent out via: Fax to [employer] at Fax Number: [phone]  Patient informed: N/A  Output date: July 23, 2020    Emelia Davison CMA      **Please close the encounter**

## 2020-08-04 DIAGNOSIS — G57.01 PIRIFORMIS SYNDROME OF RIGHT SIDE: ICD-10-CM

## 2020-08-04 RX ORDER — CELECOXIB 100 MG/1
100 CAPSULE ORAL 2 TIMES DAILY
Qty: 60 CAPSULE | Refills: 0 | Status: SHIPPED | OUTPATIENT
Start: 2020-08-04 | End: 2020-09-04

## 2020-08-04 NOTE — TELEPHONE ENCOUNTER
"Request for medication refill: Celecoxib 100mg    Providers if patient needs an appointment and you are willing to give a one month supply please refill for one month and  send a letter/MyChart using \".SMILLIMITEDREFILL\" .smillimited and route chart to \"P SMI \" (Giving one month refill in non controlled medications is strongly recommended before denial)    If refill has been denied, meaning absolutely no refills without visit, please complete the smart phrase \".smirxrefuse\" and route it to the \"P SMI MED REFILLS\"  pool to inform the patient and the pharmacy.    Iram Skaggs, CMA        "

## 2020-09-04 DIAGNOSIS — G57.01 PIRIFORMIS SYNDROME OF RIGHT SIDE: ICD-10-CM

## 2020-09-04 RX ORDER — CELECOXIB 100 MG/1
100 CAPSULE ORAL 2 TIMES DAILY
Qty: 60 CAPSULE | Refills: 0 | Status: SHIPPED | OUTPATIENT
Start: 2020-09-04 | End: 2020-10-15

## 2020-09-04 NOTE — TELEPHONE ENCOUNTER
"Request for medication refill:  celecoxib (CELEBREX) 100 MG capsule     Providers if patient needs an appointment and you are willing to give a one month supply please refill for one month and  send a letter/MyChart using \".SMILLIMITEDREFILL\" .smillimited and route chart to \"P [facility] \" (Giving one month refill in non controlled medications is strongly recommended before denial)    If refill has been denied, meaning absolutely no refills without visit, please complete the smart phrase \".smirxrefuse\" and route it to the \"P [facility] MED REFILLS\"  pool to inform the patient and the pharmacy.    Henry Parra MA        "

## 2020-09-09 ENCOUNTER — DOCUMENTATION ONLY (OUTPATIENT)
Dept: FAMILY MEDICINE | Facility: CLINIC | Age: 70
End: 2020-09-09

## 2020-09-09 NOTE — PROGRESS NOTES
"When opening a documentation only encounter, be sure to enter in \"Chief Complaint\" Forms and in \" Comments\" Title of form, description if needed.    Kimberly is a 69 year old  female  Form received via: Fax  Form now resides in: Provider Ready    Phyllis Hunt MA      Patient came in on 09/21/2020 and Discussed Documentation from 09/09/2020. Document is unable to be found in providers folder. Per protocol Encounter will be signed and closed.    BONNY Thomas                  "

## 2020-09-17 ENCOUNTER — TELEPHONE (OUTPATIENT)
Dept: FAMILY MEDICINE | Facility: CLINIC | Age: 70
End: 2020-09-17

## 2020-09-17 DIAGNOSIS — L29.9 ITCHING: ICD-10-CM

## 2020-09-17 RX ORDER — HYDROXYZINE HYDROCHLORIDE 25 MG/1
50 TABLET, FILM COATED ORAL 4 TIMES DAILY
Qty: 720 TABLET | Refills: 3 | Status: SHIPPED | OUTPATIENT
Start: 2020-09-17 | End: 2021-10-28

## 2020-09-17 NOTE — TELEPHONE ENCOUNTER
Verify that the refill encounter hasn't been started Yes    [facility] Family Medicine phone call message- patient requesting a refill:    Full Medication Name: hydrOXYzine (ATARAX) 25 MG tablet     Dose: Take 2 tablets (50 mg) by mouth 4 times daily - Oral      Pharmacy confirmed as   [id number] [address]  [address] [address]  Phone: [phone] Fax: [phone]  : Yes    Medication tab checked to see if medication has been sent  Yes    Additional Comments: Patient stated they lost their bottle of hydroxyzine, therefore requesting a refill. Patient contacted pharmacy to initiate refill but wanted to inform clinic of what happened and why they need a refill this early. Please advise.     OK to leave a message on voice mail? Yes    Advised patient refill may take up to 2 business days? Yes    Primary language: English      needed? No    Call taken on September 17, 2020 at 3:25 PM by Laney Helm to [facility] MED REFILL

## 2020-09-17 NOTE — TELEPHONE ENCOUNTER
HH orders sent to [facility].       01/24/19 7565   Post-Acute Status   Post-Acute Authorization Home Health/Hospice   Home Health/Hospice Status Referrals Sent      Patient reporting they lost hydroxazine bottle. Wanted to report to provider that they are requesting an early refill from pharmacy. Routing to PCP to advise.   Melva Diaz RN

## 2020-09-21 ENCOUNTER — OFFICE VISIT (OUTPATIENT)
Dept: FAMILY MEDICINE | Facility: CLINIC | Age: 70
End: 2020-09-21
Payer: MEDICARE

## 2020-09-21 VITALS
DIASTOLIC BLOOD PRESSURE: 94 MMHG | OXYGEN SATURATION: 98 % | HEIGHT: 64 IN | WEIGHT: 175 LBS | SYSTOLIC BLOOD PRESSURE: 166 MMHG | HEART RATE: 86 BPM | TEMPERATURE: 98.6 F | BODY MASS INDEX: 29.88 KG/M2 | RESPIRATION RATE: 18 BRPM

## 2020-09-21 DIAGNOSIS — G89.4 CHRONIC PAIN SYNDROME: ICD-10-CM

## 2020-09-21 DIAGNOSIS — F43.10 PTSD (POST-TRAUMATIC STRESS DISORDER): Primary | ICD-10-CM

## 2020-09-21 DIAGNOSIS — F31.70 BIPOLAR AFFECTIVE DISORDER IN REMISSION (H): ICD-10-CM

## 2020-09-21 ASSESSMENT — MIFFLIN-ST. JEOR: SCORE: 1305.3

## 2020-09-21 NOTE — PATIENT INSTRUCTIONS
Here is the plan from today's visit    1. Bipolar affective disorder in remission (H)      2. PTSD (post-traumatic stress disorder)      3. Chronic pain syndrome        Please call or return to clinic if your symptoms don't go away.    Follow up plan  No follow-ups on file.     Thank you for coming to [facility] today.  Lab Testing:  **If you had lab testing today and your results are reassuring or normal they will be mailed to you or sent through [employer] within 7 days.   **If the lab tests need quick action we will call you with the results.  The phone number we will call with results is # [phone] (home) . If this is not the best number please call our clinic and change the number.  Medication Refills:  If you need any refills please call your pharmacy and they will contact us.   If you need to  your refill at a new pharmacy, please contact the new pharmacy directly. The new pharmacy will help you get your medications transferred faster.   Scheduling:  If you have any concerns about today's visit or wish to schedule another appointment please call our office during normal business hours [phone] (8-5:00 M-F)  Information about [place] Medical Cannabis Program  You have been certified with the following diagnosis  Chronic Pain  Post traumatic stress disorder    I will complete your registration at the [place] Medical Cannabis Registration the next step is up to the State (This can take up to 30 days)  Registration involves the following steps:  Step 1: The patient visits his/her health care practitioner. (Done)  Step 2: The patient's health care practitioner enrolls in the Medical Cannabis Registry and certifies that the patient has a qualifying medical condition.( This will be done today or tomorrow using the e-mail address [email or web address] and Phone number [phone] )  Step 3: The patient gets an email with a link to the enrollment application. If the patient has a caregiver, the  caregiver will need to complete an application and pass a background check.  Step 4: The patient (and caregiver, if applicable) will be notified by the Office of Medical Cannabis once the application is approved.  Step 5 : After you are approved you will be given information about Cannabis pharmacies that you can go to buy medical cannabis. The pharmacist at this location will work with you to determine the best products.    Medical Cannabis is usually not the best choice nor the best studied choice for the above conditions.  Annual program fee is $200, ($50 for some Medical Assistance Programs/Social security Disability.)    All cost for the medication is out-of-pocket and can average between $100-$300/month      You will need to be recertified yearly.      Corey Brooks MD
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
## 2020-10-15 DIAGNOSIS — G57.01 PIRIFORMIS SYNDROME OF RIGHT SIDE: ICD-10-CM

## 2020-10-15 RX ORDER — CELECOXIB 100 MG/1
100 CAPSULE ORAL 2 TIMES DAILY
Qty: 60 CAPSULE | Refills: 0 | Status: SHIPPED | OUTPATIENT
Start: 2020-10-15 | End: 2020-12-22

## 2020-12-17 DIAGNOSIS — F31.70 BIPOLAR AFFECTIVE DISORDER IN REMISSION (H): ICD-10-CM

## 2020-12-17 RX ORDER — BUPROPION HYDROCHLORIDE 100 MG/1
200 TABLET, EXTENDED RELEASE ORAL DAILY
Qty: 180 TABLET | Refills: 1 | Status: SHIPPED | OUTPATIENT
Start: 2020-12-17 | End: 2021-06-18

## 2020-12-17 NOTE — TELEPHONE ENCOUNTER

## 2020-12-22 DIAGNOSIS — G57.01 PIRIFORMIS SYNDROME OF RIGHT SIDE: ICD-10-CM

## 2020-12-22 RX ORDER — CELECOXIB 100 MG/1
100 CAPSULE ORAL 2 TIMES DAILY
Qty: 60 CAPSULE | Refills: 0 | Status: SHIPPED | OUTPATIENT
Start: 2020-12-22 | End: 2021-01-19

## 2021-01-14 ENCOUNTER — HEALTH MAINTENANCE LETTER (OUTPATIENT)
Age: 71
End: 2021-01-14

## 2021-01-19 DIAGNOSIS — G57.01 PIRIFORMIS SYNDROME OF RIGHT SIDE: ICD-10-CM

## 2021-01-19 RX ORDER — CELECOXIB 100 MG/1
100 CAPSULE ORAL 2 TIMES DAILY
Qty: 60 CAPSULE | Refills: 0 | Status: SHIPPED | OUTPATIENT
Start: 2021-01-19 | End: 2021-05-04

## 2021-03-14 ENCOUNTER — HEALTH MAINTENANCE LETTER (OUTPATIENT)
Age: 71
End: 2021-03-14

## 2021-05-04 DIAGNOSIS — G57.01 PIRIFORMIS SYNDROME OF RIGHT SIDE: ICD-10-CM

## 2021-05-04 RX ORDER — CELECOXIB 100 MG/1
100 CAPSULE ORAL 2 TIMES DAILY
Qty: 60 CAPSULE | Refills: 0 | Status: SHIPPED | OUTPATIENT
Start: 2021-05-04 | End: 2021-06-08

## 2021-05-04 NOTE — TELEPHONE ENCOUNTER
"Request for medication refill:  celecoxib (CELEBREX) 100 MG capsule  Providers if patient needs an appointment and you are willing to give a one month supply please refill for one month and  send a letter/MyChart using \".SMILLIMITEDREFILL\" .smillimited and route chart to \"P [facility] \" (Giving one month refill in non controlled medications is strongly recommended before denial)    If refill has been denied, meaning absolutely no refills without visit, please complete the smart phrase \".smirxrefuse\" and route it to the \"P [facility] MED REFILLS\"  pool to inform the patient and the pharmacy.    Elza Woodson        "

## 2021-06-08 DIAGNOSIS — G57.01 PIRIFORMIS SYNDROME OF RIGHT SIDE: ICD-10-CM

## 2021-06-08 RX ORDER — CELECOXIB 100 MG/1
100 CAPSULE ORAL 2 TIMES DAILY
Qty: 60 CAPSULE | Refills: 0 | Status: SHIPPED | OUTPATIENT
Start: 2021-06-08 | End: 2021-07-15

## 2021-06-18 DIAGNOSIS — F31.70 BIPOLAR AFFECTIVE DISORDER IN REMISSION (H): ICD-10-CM

## 2021-06-18 RX ORDER — BUPROPION HYDROCHLORIDE 100 MG/1
200 TABLET, EXTENDED RELEASE ORAL DAILY
Qty: 180 TABLET | Refills: 1 | Status: SHIPPED | OUTPATIENT
Start: 2021-06-18 | End: 2021-12-31

## 2021-06-18 NOTE — TELEPHONE ENCOUNTER

## 2021-07-01 ENCOUNTER — DOCUMENTATION ONLY (OUTPATIENT)
Dept: FAMILY MEDICINE | Facility: CLINIC | Age: 71
End: 2021-07-01

## 2021-07-14 DIAGNOSIS — G57.01 PIRIFORMIS SYNDROME OF RIGHT SIDE: ICD-10-CM

## 2021-07-14 NOTE — TELEPHONE ENCOUNTER
"Request for medication refill:  celecoxib (CELEBREX) 100 MG capsule    Providers if patient needs an appointment and you are willing to give a one month supply please refill for one month and  send a letter/MyChart using \".SMILLIMITEDREFILL\" .smillimited and route chart to \"P [facility] \" (Giving one month refill in non controlled medications is strongly recommended before denial)    If refill has been denied, meaning absolutely no refills without visit, please complete the smart phrase \".smirxrefuse\" and route it to the \"P [facility] MED REFILLS\"  pool to inform the patient and the pharmacy.    Aide Land          "

## 2021-07-15 RX ORDER — CELECOXIB 100 MG/1
100 CAPSULE ORAL 2 TIMES DAILY
Qty: 60 CAPSULE | Refills: 0 | Status: SHIPPED | OUTPATIENT
Start: 2021-07-15 | End: 2021-08-10

## 2021-07-16 DIAGNOSIS — L30.8 SPONGIOTIC PSORIASIFORM DERMATITIS: ICD-10-CM

## 2021-07-16 RX ORDER — TRIAMCINOLONE ACETONIDE 5 MG/G
CREAM TOPICAL
Qty: 60 G | Refills: 1 | Status: SHIPPED | OUTPATIENT
Start: 2021-07-16 | End: 2022-04-12

## 2021-07-20 NOTE — PROGRESS NOTES
"Telephone call to the client's home for annual health risk assessment.  An  was not needed.     Current situation/living environment  Completed health risk assess via phone, she lives alone in private home and reports that she manages her affairs independently       Activities of daily living (ADL)/instrumental activities of daily living (IADL) and functional issues  Client needs help with the following ADL's: NA, indep  Client needs help with the following IADL's: NA, indep  Client states she is unable to perform the above due to reports indep with all affairs        Health concerns for today  No stated concerns, recommended preventive cares including updating immunizations and cancer screenings  Has patient fallen 2 or more times in the last year? No  Has patient fallen with injury in the last year? No     Cognition/mental health  A&Ox3, directs her own care. Takes medication for bipolar and anxiety, reports long history of using \"black market\" drugs since 1960's, she is not interested in chemical dependency info/resources.     STARS/Med Adherence  Client is non-compliant with the following quality measures: Breast cancer screening  Comments: education efforts     Client's Plan of Care consists of:  Transportation, rides to medical appts are available if needed through [facility] - but she reports being indep driving. She plans to remain indep with her affairs at this time.      Francisca Caro RN  [facility] Care Coordinator  [phone] or [phone]  "

## 2021-08-10 DIAGNOSIS — G57.01 PIRIFORMIS SYNDROME OF RIGHT SIDE: ICD-10-CM

## 2021-08-10 RX ORDER — CELECOXIB 100 MG/1
100 CAPSULE ORAL 2 TIMES DAILY
Qty: 180 CAPSULE | Refills: 0 | Status: SHIPPED | OUTPATIENT
Start: 2021-08-10 | End: 2021-10-26

## 2021-08-10 NOTE — TELEPHONE ENCOUNTER
[facility] phone call message- patient requesting a refill:    Full Medication Name: celecoxib (CELEBREX) 100 MG capsule    Dose: Route: Take 1 capsule (100 mg) by mouth 2 times daily - Oral    Pharmacy confirmed as   [id number] - [place], [place] - [address]  [address] [address]  Phone: [phone] Fax: [phone]  : Yes    Additional Comments: Pt would like a 90 day supply instead of 30 day supply. Please advise     OK to leave a message on voice mail? Yes    Primary language: English      needed? No    Call taken on August 10, 2021 at 3:05 PM by Francisco Javier Sheriff

## 2021-08-10 NOTE — TELEPHONE ENCOUNTER
"See note below- patient requesting refill of celebrex. Would like 90 day supply instead of 30 day supply. Last office visit 9/21/20 with Dr. Brooks. RN unable to change quantity of prescription, routing to PCP to send if appropriate.   Melva Diaz RN      Request for medication refill:    Providers if patient needs an appointment and you are willing to give a one month supply please refill for one month and  send a letter/MyChart using \".SMILLIMITEDREFILL\" .smillimited and route chart to \"P [facility] \" (Giving one month refill in non controlled medications is strongly recommended before denial)    If refill has been denied, meaning absolutely no refills without visit, please complete the smart phrase \".smirxrefuse\" and route it to the \"P [facility] MED REFILLS\"  pool to inform the patient and the pharmacy.        "

## 2021-10-26 DIAGNOSIS — G57.01 PIRIFORMIS SYNDROME OF RIGHT SIDE: ICD-10-CM

## 2021-10-26 RX ORDER — CELECOXIB 100 MG/1
100 CAPSULE ORAL 2 TIMES DAILY
Qty: 180 CAPSULE | Refills: 0 | Status: SHIPPED | OUTPATIENT
Start: 2021-10-26 | End: 2021-10-29

## 2021-10-26 NOTE — TELEPHONE ENCOUNTER
"Request for medication refill:celecoxib (CELEBREX) 100 MG capsule    Providers if patient needs an appointment and you are willing to give a one month supply please refill for one month and  send a letter/MyChart using \".SMILLIMITEDREFILL\" .smillimited and route chart to \"P [facility] \" (Giving one month refill in non controlled medications is strongly recommended before denial)    If refill has been denied, meaning absolutely no refills without visit, please complete the smart phrase \".smirxrefuse\" and route it to the \"P [facility] MED REFILLS\"  pool to inform the patient and the pharmacy.    Nichole Becerra        "

## 2021-10-28 ENCOUNTER — TELEPHONE (OUTPATIENT)
Dept: FAMILY MEDICINE | Facility: CLINIC | Age: 71
End: 2021-10-28

## 2021-10-28 DIAGNOSIS — G57.01 PIRIFORMIS SYNDROME OF RIGHT SIDE: ICD-10-CM

## 2021-10-28 DIAGNOSIS — L29.9 ITCHING: ICD-10-CM

## 2021-10-28 RX ORDER — HYDROXYZINE HYDROCHLORIDE 25 MG/1
50 TABLET, FILM COATED ORAL 4 TIMES DAILY
Qty: 720 TABLET | Refills: 3 | Status: SHIPPED | OUTPATIENT
Start: 2021-10-28 | End: 2022-04-12

## 2021-10-28 NOTE — TELEPHONE ENCOUNTER

## 2021-10-28 NOTE — TELEPHONE ENCOUNTER
[facility] phone call message- medication clarification/question:    Full Medication Name: Celebrex   Dose: 100 mg capsule    Question/Clarification needed: Patient would like to increase dosage to 3 x a day for this medication due to pain.     Pharmacy confirmed as : Yes    Please leave ONLY preferred pharmacy  [facility] [address] [facility] - [facility] [address]  [address] [address]  Phone: [phone] Fax: [phone]    OK to leave a message on voice mail? Yes    Advised patient that RN would call back within 3 hours, unless emergent.    Primary language: English      needed? No    Call taken on October 28, 2021 at 3:19 PM by Carla Goldman CMA    Route to [facility]
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
## 2021-10-29 RX ORDER — CELECOXIB 100 MG/1
100 CAPSULE ORAL
Qty: 90 CAPSULE | Refills: 0 | Status: SHIPPED | OUTPATIENT
Start: 2021-10-29 | End: 2021-11-18

## 2021-11-18 ENCOUNTER — OFFICE VISIT (OUTPATIENT)
Dept: FAMILY MEDICINE | Facility: CLINIC | Age: 71
End: 2021-11-18
Payer: MEDICARE

## 2021-11-18 VITALS
BODY MASS INDEX: 30.39 KG/M2 | HEART RATE: 85 BPM | OXYGEN SATURATION: 98 % | SYSTOLIC BLOOD PRESSURE: 153 MMHG | WEIGHT: 178 LBS | RESPIRATION RATE: 16 BRPM | TEMPERATURE: 98.1 F | HEIGHT: 64 IN | DIASTOLIC BLOOD PRESSURE: 80 MMHG

## 2021-11-18 DIAGNOSIS — M16.10 HIP ARTHRITIS: Primary | ICD-10-CM

## 2021-11-18 DIAGNOSIS — G57.01 PIRIFORMIS SYNDROME OF RIGHT SIDE: ICD-10-CM

## 2021-11-18 DIAGNOSIS — Z51.81 ENCOUNTER FOR THERAPEUTIC DRUG MONITORING: ICD-10-CM

## 2021-11-18 DIAGNOSIS — Z23 HIGH PRIORITY FOR 2019-NCOV VACCINE: ICD-10-CM

## 2021-11-18 LAB
ANION GAP SERPL CALCULATED.3IONS-SCNC: 3 MMOL/L (ref 3–14)
BUN SERPL-MCNC: 25 MG/DL (ref 7–30)
CALCIUM SERPL-MCNC: 9 MG/DL (ref 8.5–10.1)
CHLORIDE BLD-SCNC: 108 MMOL/L (ref 94–109)
CO2 SERPL-SCNC: 27 MMOL/L (ref 20–32)
CREAT SERPL-MCNC: 0.98 MG/DL (ref 0.52–1.04)
GFR SERPL CREATININE-BSD FRML MDRD: 59 ML/MIN/1.73M2
GLUCOSE BLD-MCNC: 119 MG/DL (ref 70–99)
POTASSIUM BLD-SCNC: 4.2 MMOL/L (ref 3.4–5.3)
SODIUM SERPL-SCNC: 138 MMOL/L (ref 133–144)

## 2021-11-18 PROCEDURE — 80048 BASIC METABOLIC PNL TOTAL CA: CPT | Performed by: FAMILY MEDICINE

## 2021-11-18 PROCEDURE — 91306 COVID-19,PF,MODERNA (18+ YRS BOOSTER .25ML): CPT | Performed by: FAMILY MEDICINE

## 2021-11-18 PROCEDURE — 99213 OFFICE O/P EST LOW 20 MIN: CPT | Mod: 25 | Performed by: FAMILY MEDICINE

## 2021-11-18 PROCEDURE — 36415 COLL VENOUS BLD VENIPUNCTURE: CPT | Performed by: FAMILY MEDICINE

## 2021-11-18 PROCEDURE — 0064A COVID-19,PF,MODERNA (18+ YRS BOOSTER .25ML): CPT | Performed by: FAMILY MEDICINE

## 2021-11-18 RX ORDER — CELECOXIB 100 MG/1
100 CAPSULE ORAL 2 TIMES DAILY
Qty: 90 CAPSULE | Refills: 0 | Status: SHIPPED | OUTPATIENT
Start: 2021-11-18 | End: 2022-01-10

## 2021-11-18 ASSESSMENT — MIFFLIN-ST. JEOR: SCORE: 1313.91

## 2021-11-18 NOTE — PROGRESS NOTES
"[facility] Progress Note          Assessment & Plan     Hip arthritis   Piriformis syndrome of right side    - celecoxib (CELEBREX) 100 MG capsule; Take 1 capsule (100 mg) by mouth 2 times daily Please follow up before next refill.    Encounter for therapeutic drug monitoring  We will check helical back to her pylori because she is on a chronic sensate.  And also check BMP.  - Helicobacter pylori Antigen Stool; Future  - Basic metabolic panel; Future  - Basic metabolic panel    High priority for 2019-nCoV vaccine    - COVID-19,PF,MODERNA (18+ Yrs BOOSTER .25mL)      I spent a total of 21 minutes on the day of the visit.   Time spent doing chart review, history and exam, documentation and further activities per the note       BMI:   Estimated body mass index is 30.76 kg/m  as calculated from the following:    Height as of this encounter: 1.62 m (5' 3.78\").    Weight as of this encounter: 80.7 kg (178 lb).           Return in about 4 weeks (around 12/16/2021) for CPE/Wellness Visit.    Corey Brooks MD  [facility] 70 year [facility] who presents for the following health issues   Patient presents with:  Medication Follow-up: pt here to follow up on celebrex medication. no other questions or concerns.   Imm/Inj: COVID-19 VACCINE        HPI   Arthritis   patient has known hip arthritis and takes Celebrex, she had not been in the clinic for some time and so I encouraged her to come in for refills and evaluation and discussion of preventive care.  Patient reports that this Celebrex works quite well.  History of bipolar affective disorder  Patient reports that she is no longer taking her quetiapine.  So she is at risk for having another bipolar episode.        Review of Systems         Objective    BP (!) 153/80   Pulse 85   Temp 98.1  F (36.7  C) (Oral)   Resp 16   Ht 1.62 m (5' 3.78\")   Wt 80.7 kg (178 lb)   SpO2 98%   BMI 30.76 kg/m    Body mass index is 30.76 " kg/m .  Physical Exam  Vitals and nursing note reviewed.   Constitutional:       General: She is not in acute distress.     Appearance: She is well-developed.   Neurological:      Mental Status: She is alert and oriented to person, place, and time.   Psychiatric:         Speech: Speech normal.         Behavior: Behavior normal.         Thought Content: Thought content normal.         Judgment: Judgment normal.      Comments: MENTAL STATUS EXAM  Appearance: appropriate  Attitude: cooperative  Behavior: normal  Eye Contact: normal  Speech: Mildly tangential.  No still organized.  Orientation: oriented to person , place, time and situation  Mood: very positive   Affect: Mood Congruent  Thought Process: clear  Suicidal Ideation: reports no recent thoughts, no intention  Hallucination: no

## 2021-12-31 DIAGNOSIS — F31.70 BIPOLAR AFFECTIVE DISORDER IN REMISSION (H): ICD-10-CM

## 2021-12-31 RX ORDER — BUPROPION HYDROCHLORIDE 100 MG/1
200 TABLET, EXTENDED RELEASE ORAL DAILY
Qty: 180 TABLET | Refills: 1 | Status: SHIPPED | OUTPATIENT
Start: 2021-12-31 | End: 2022-04-12

## 2021-12-31 NOTE — TELEPHONE ENCOUNTER

## 2022-01-10 DIAGNOSIS — G57.01 PIRIFORMIS SYNDROME OF RIGHT SIDE: ICD-10-CM

## 2022-01-10 RX ORDER — CELECOXIB 100 MG/1
100 CAPSULE ORAL 2 TIMES DAILY
Qty: 90 CAPSULE | Refills: 0 | Status: SHIPPED | OUTPATIENT
Start: 2022-01-10 | End: 2022-02-21

## 2022-01-10 NOTE — TELEPHONE ENCOUNTER
"Request for medication refill:  celecoxib (CELEBREX) 100 MG capsule  Providers if patient needs an appointment and you are willing to give a one month supply please refill for one month and  send a letter/MyChart using \".SMILLIMITEDREFILL\" .smillimited and route chart to \"P [facility] \" (Giving one month refill in non controlled medications is strongly recommended before denial)    If refill has been denied, meaning absolutely no refills without visit, please complete the smart phrase \".smirxrefuse\" and route it to the \"P [facility] MED REFILLS\"  pool to inform the patient and the pharmacy.    Aide Land        "

## 2022-01-15 NOTE — TELEPHONE ENCOUNTER
Left VM for patient informing requested medication sent to pharmacy.    Vivian Clarke RN    
[facility] phone call message- patient requesting a refill:    Full Medication Name: acyclovir (ZOVIRAX) 400 MG tablet         Pharmacy confirmed as   [facility] - [place], [place] - [address]  [address]  [facility] [address]  Phone: [phone] Fax: [phone]        [facility] [address] [facility] - [place], [place] - [address]  [address] [address]  Phone: [phone] Fax: [phone]  : Yes    Additional Comments: Pls send Rx to pharmacy.        OK to leave a message on voice mail? Yes    Primary language: English      needed? No    Call taken on October 20, 2017 at 1:28 PM by Cherelle Richards    
Refilled acyclovir. Please contact patient to inform.   Thanks,   Niharika Ewing  
Request for medication refill: The pharmacy is requesting an Rx for Acyclovir 400mg. This medication was discontinued. Please add back to medication list and order. Thank you!    Date of last visit at clinic: 9-12-17    Please complete refill if appropriate and CLOSE ENCOUNTER.    Closing the encounter signifies the refill is complete.    If refill has been denied, please complete the smart phrase .smirefuse and route it to the [facility] RN TRIAGE pool to inform the patient and the pharmacy.    Alba Wagner CMA        
No

## 2022-01-23 ENCOUNTER — HOSPITAL ENCOUNTER (OUTPATIENT)
Facility: CLINIC | Age: 72
Setting detail: OBSERVATION
Discharge: HOME | End: 2022-01-25
Attending: FAMILY MEDICINE | Admitting: NURSE PRACTITIONER
Payer: MEDICARE

## 2022-01-23 ENCOUNTER — APPOINTMENT (OUTPATIENT)
Dept: GENERAL RADIOLOGY | Facility: CLINIC | Age: 72
End: 2022-01-23
Attending: NURSE PRACTITIONER
Payer: MEDICARE

## 2022-01-23 ENCOUNTER — APPOINTMENT (OUTPATIENT)
Dept: CT IMAGING | Facility: CLINIC | Age: 72
End: 2022-01-23
Attending: FAMILY MEDICINE
Payer: MEDICARE

## 2022-01-23 DIAGNOSIS — E78.2 MIXED HYPERLIPIDEMIA: ICD-10-CM

## 2022-01-23 DIAGNOSIS — R10.9 RIGHT FLANK PAIN: ICD-10-CM

## 2022-01-23 DIAGNOSIS — R11.0 NAUSEA: Primary | ICD-10-CM

## 2022-01-23 DIAGNOSIS — N13.5 UPJ (URETEROPELVIC JUNCTION) OBSTRUCTION: ICD-10-CM

## 2022-01-23 DIAGNOSIS — R73.9 HYPERGLYCEMIA: ICD-10-CM

## 2022-01-23 DIAGNOSIS — R31.1 BENIGN ESSENTIAL MICROSCOPIC HEMATURIA: ICD-10-CM

## 2022-01-23 DIAGNOSIS — Z20.822 LAB TEST NEGATIVE FOR COVID-19 VIRUS: ICD-10-CM

## 2022-01-23 LAB
ALBUMIN SERPL-MCNC: 3.6 G/DL (ref 3.4–5)
ALBUMIN UR-MCNC: 100 MG/DL
ALP SERPL-CCNC: 79 U/L (ref 40–150)
ALT SERPL W P-5'-P-CCNC: 20 U/L (ref 0–50)
ANION GAP SERPL CALCULATED.3IONS-SCNC: 7 MMOL/L (ref 3–14)
APPEARANCE UR: CLEAR
AST SERPL W P-5'-P-CCNC: 13 U/L (ref 0–45)
BASOPHILS # BLD AUTO: 0.1 10E3/UL (ref 0–0.2)
BASOPHILS NFR BLD AUTO: 1 %
BILIRUB SERPL-MCNC: 0.3 MG/DL (ref 0.2–1.3)
BILIRUB UR QL STRIP: NEGATIVE
BUN SERPL-MCNC: 17 MG/DL (ref 7–30)
CALCIUM SERPL-MCNC: 9.7 MG/DL (ref 8.5–10.1)
CHLORIDE BLD-SCNC: 104 MMOL/L (ref 94–109)
CO2 SERPL-SCNC: 26 MMOL/L (ref 20–32)
COLOR UR AUTO: ABNORMAL
CREAT SERPL-MCNC: 0.94 MG/DL (ref 0.52–1.04)
EOSINOPHIL # BLD AUTO: 0.1 10E3/UL (ref 0–0.7)
EOSINOPHIL NFR BLD AUTO: 1 %
ERYTHROCYTE [DISTWIDTH] IN BLOOD BY AUTOMATED COUNT: 12.3 % (ref 10–15)
GFR SERPL CREATININE-BSD FRML MDRD: 65 ML/MIN/1.73M2
GLUCOSE BLD-MCNC: 108 MG/DL (ref 70–99)
GLUCOSE UR STRIP-MCNC: NEGATIVE MG/DL
HCT VFR BLD AUTO: 39.4 % (ref 35–47)
HGB BLD-MCNC: 13.1 G/DL (ref 11.7–15.7)
HGB UR QL STRIP: ABNORMAL
IMM GRANULOCYTES # BLD: 0 10E3/UL
IMM GRANULOCYTES NFR BLD: 0 %
KETONES UR STRIP-MCNC: NEGATIVE MG/DL
LACTATE SERPL-SCNC: 1.7 MMOL/L (ref 0.7–2)
LEUKOCYTE ESTERASE UR QL STRIP: ABNORMAL
LIPASE SERPL-CCNC: 154 U/L (ref 73–393)
LYMPHOCYTES # BLD AUTO: 2.1 10E3/UL (ref 0.8–5.3)
LYMPHOCYTES NFR BLD AUTO: 20 %
MCH RBC QN AUTO: 28.6 PG (ref 26.5–33)
MCHC RBC AUTO-ENTMCNC: 33.2 G/DL (ref 31.5–36.5)
MCV RBC AUTO: 86 FL (ref 78–100)
MONOCYTES # BLD AUTO: 0.7 10E3/UL (ref 0–1.3)
MONOCYTES NFR BLD AUTO: 7 %
MUCOUS THREADS #/AREA URNS LPF: PRESENT /LPF
NEUTROPHILS # BLD AUTO: 7.7 10E3/UL (ref 1.6–8.3)
NEUTROPHILS NFR BLD AUTO: 71 %
NITRATE UR QL: NEGATIVE
NRBC # BLD AUTO: 0 10E3/UL
NRBC BLD AUTO-RTO: 0 /100
PH UR STRIP: 6.5 [PH] (ref 5–7)
PLATELET # BLD AUTO: 364 10E3/UL (ref 150–450)
POTASSIUM BLD-SCNC: 3.6 MMOL/L (ref 3.4–5.3)
PROT SERPL-MCNC: 7.9 G/DL (ref 6.8–8.8)
RBC # BLD AUTO: 4.58 10E6/UL (ref 3.8–5.2)
RBC URINE: 5 /HPF
SARS-COV-2 RNA RESP QL NAA+PROBE: NEGATIVE
SODIUM SERPL-SCNC: 137 MMOL/L (ref 133–144)
SP GR UR STRIP: 1.01 (ref 1–1.03)
SQUAMOUS EPITHELIAL: <1 /HPF
UROBILINOGEN UR STRIP-MCNC: NORMAL MG/DL
WBC # BLD AUTO: 10.7 10E3/UL (ref 4–11)
WBC URINE: 3 /HPF

## 2022-01-23 PROCEDURE — 80053 COMPREHEN METABOLIC PANEL: CPT | Performed by: FAMILY MEDICINE

## 2022-01-23 PROCEDURE — C9803 HOPD COVID-19 SPEC COLLECT: HCPCS | Performed by: FAMILY MEDICINE

## 2022-01-23 PROCEDURE — 83690 ASSAY OF LIPASE: CPT | Performed by: FAMILY MEDICINE

## 2022-01-23 PROCEDURE — 250N000011 HC RX IP 250 OP 636: Performed by: STUDENT IN AN ORGANIZED HEALTH CARE EDUCATION/TRAINING PROGRAM

## 2022-01-23 PROCEDURE — 250N000011 HC RX IP 250 OP 636: Performed by: FAMILY MEDICINE

## 2022-01-23 PROCEDURE — 96374 THER/PROPH/DIAG INJ IV PUSH: CPT | Performed by: FAMILY MEDICINE

## 2022-01-23 PROCEDURE — 71046 X-RAY EXAM CHEST 2 VIEWS: CPT | Mod: 26 | Performed by: RADIOLOGY

## 2022-01-23 PROCEDURE — 74176 CT ABD & PELVIS W/O CONTRAST: CPT

## 2022-01-23 PROCEDURE — 96376 TX/PRO/DX INJ SAME DRUG ADON: CPT | Performed by: FAMILY MEDICINE

## 2022-01-23 PROCEDURE — 85025 COMPLETE CBC W/AUTO DIFF WBC: CPT | Performed by: FAMILY MEDICINE

## 2022-01-23 PROCEDURE — 96375 TX/PRO/DX INJ NEW DRUG ADDON: CPT | Performed by: FAMILY MEDICINE

## 2022-01-23 PROCEDURE — 71046 X-RAY EXAM CHEST 2 VIEWS: CPT

## 2022-01-23 PROCEDURE — G0378 HOSPITAL OBSERVATION PER HR: HCPCS

## 2022-01-23 PROCEDURE — 36415 COLL VENOUS BLD VENIPUNCTURE: CPT | Performed by: FAMILY MEDICINE

## 2022-01-23 PROCEDURE — 81001 URINALYSIS AUTO W/SCOPE: CPT | Performed by: FAMILY MEDICINE

## 2022-01-23 PROCEDURE — 99285 EMERGENCY DEPT VISIT HI MDM: CPT | Mod: 25 | Performed by: FAMILY MEDICINE

## 2022-01-23 PROCEDURE — 83605 ASSAY OF LACTIC ACID: CPT | Performed by: FAMILY MEDICINE

## 2022-01-23 PROCEDURE — 96361 HYDRATE IV INFUSION ADD-ON: CPT | Performed by: FAMILY MEDICINE

## 2022-01-23 PROCEDURE — 82040 ASSAY OF SERUM ALBUMIN: CPT | Performed by: FAMILY MEDICINE

## 2022-01-23 PROCEDURE — 87635 SARS-COV-2 COVID-19 AMP PRB: CPT | Performed by: FAMILY MEDICINE

## 2022-01-23 PROCEDURE — 258N000003 HC RX IP 258 OP 636: Performed by: FAMILY MEDICINE

## 2022-01-23 PROCEDURE — 250N000013 HC RX MED GY IP 250 OP 250 PS 637: Performed by: STUDENT IN AN ORGANIZED HEALTH CARE EDUCATION/TRAINING PROGRAM

## 2022-01-23 RX ORDER — KETOROLAC TROMETHAMINE 15 MG/ML
15 INJECTION, SOLUTION INTRAMUSCULAR; INTRAVENOUS ONCE
Status: COMPLETED | OUTPATIENT
Start: 2022-01-23 | End: 2022-01-23

## 2022-01-23 RX ORDER — ONDANSETRON 2 MG/ML
INJECTION INTRAMUSCULAR; INTRAVENOUS
Status: DISCONTINUED
Start: 2022-01-23 | End: 2022-01-23 | Stop reason: HOSPADM

## 2022-01-23 RX ORDER — HYDROMORPHONE HCL IN WATER/PF 6 MG/30 ML
0.2 PATIENT CONTROLLED ANALGESIA SYRINGE INTRAVENOUS ONCE
Status: COMPLETED | OUTPATIENT
Start: 2022-01-23 | End: 2022-01-23

## 2022-01-23 RX ORDER — NALOXONE HYDROCHLORIDE 0.4 MG/ML
0.4 INJECTION, SOLUTION INTRAMUSCULAR; INTRAVENOUS; SUBCUTANEOUS
Status: DISCONTINUED | OUTPATIENT
Start: 2022-01-23 | End: 2022-01-25 | Stop reason: HOSPADM

## 2022-01-23 RX ORDER — CELECOXIB 100 MG/1
100 CAPSULE ORAL 2 TIMES DAILY
Status: DISCONTINUED | OUTPATIENT
Start: 2022-01-23 | End: 2022-01-25 | Stop reason: HOSPADM

## 2022-01-23 RX ORDER — ONDANSETRON 2 MG/ML
8 INJECTION INTRAMUSCULAR; INTRAVENOUS ONCE
Status: COMPLETED | OUTPATIENT
Start: 2022-01-23 | End: 2022-01-23

## 2022-01-23 RX ORDER — PROCHLORPERAZINE 25 MG
12.5 SUPPOSITORY, RECTAL RECTAL EVERY 12 HOURS PRN
Status: DISCONTINUED | OUTPATIENT
Start: 2022-01-23 | End: 2022-01-24

## 2022-01-23 RX ORDER — LABETALOL HYDROCHLORIDE 5 MG/ML
5 INJECTION, SOLUTION INTRAVENOUS ONCE
Status: COMPLETED | OUTPATIENT
Start: 2022-01-23 | End: 2022-01-24

## 2022-01-23 RX ORDER — ONDANSETRON 4 MG/1
4 TABLET, ORALLY DISINTEGRATING ORAL EVERY 6 HOURS PRN
Status: DISCONTINUED | OUTPATIENT
Start: 2022-01-23 | End: 2022-01-25

## 2022-01-23 RX ORDER — DIAZEPAM 10 MG
10 TABLET ORAL ONCE
Status: COMPLETED | OUTPATIENT
Start: 2022-01-23 | End: 2022-01-23

## 2022-01-23 RX ORDER — PROCHLORPERAZINE MALEATE 5 MG
5 TABLET ORAL EVERY 6 HOURS PRN
Status: DISCONTINUED | OUTPATIENT
Start: 2022-01-23 | End: 2022-01-24

## 2022-01-23 RX ORDER — NALOXONE HYDROCHLORIDE 0.4 MG/ML
0.2 INJECTION, SOLUTION INTRAMUSCULAR; INTRAVENOUS; SUBCUTANEOUS
Status: DISCONTINUED | OUTPATIENT
Start: 2022-01-23 | End: 2022-01-25 | Stop reason: HOSPADM

## 2022-01-23 RX ORDER — ONDANSETRON 2 MG/ML
4 INJECTION INTRAMUSCULAR; INTRAVENOUS EVERY 6 HOURS PRN
Status: DISCONTINUED | OUTPATIENT
Start: 2022-01-23 | End: 2022-01-25

## 2022-01-23 RX ORDER — BUPROPION HYDROCHLORIDE 100 MG/1
100 TABLET, EXTENDED RELEASE ORAL 2 TIMES DAILY
Status: DISCONTINUED | OUTPATIENT
Start: 2022-01-23 | End: 2022-01-25 | Stop reason: HOSPADM

## 2022-01-23 RX ORDER — AMOXICILLIN 250 MG
1 CAPSULE ORAL
Status: DISCONTINUED | OUTPATIENT
Start: 2022-01-23 | End: 2022-01-25

## 2022-01-23 RX ORDER — METHOCARBAMOL 500 MG/1
500 TABLET, FILM COATED ORAL 4 TIMES DAILY
Status: DISCONTINUED | OUTPATIENT
Start: 2022-01-24 | End: 2022-01-25 | Stop reason: HOSPADM

## 2022-01-23 RX ORDER — HYDROXYZINE HYDROCHLORIDE 25 MG/1
50 TABLET, FILM COATED ORAL EVERY 4 HOURS PRN
Status: DISCONTINUED | OUTPATIENT
Start: 2022-01-23 | End: 2022-01-25 | Stop reason: HOSPADM

## 2022-01-23 RX ORDER — HYDROMORPHONE HYDROCHLORIDE 1 MG/ML
0.5 INJECTION, SOLUTION INTRAMUSCULAR; INTRAVENOUS; SUBCUTANEOUS ONCE
Status: COMPLETED | OUTPATIENT
Start: 2022-01-23 | End: 2022-01-23

## 2022-01-23 RX ORDER — KETOROLAC TROMETHAMINE 30 MG/ML
15 INJECTION, SOLUTION INTRAMUSCULAR; INTRAVENOUS EVERY 6 HOURS PRN
Status: DISCONTINUED | OUTPATIENT
Start: 2022-01-23 | End: 2022-01-25 | Stop reason: HOSPADM

## 2022-01-23 RX ORDER — HYDROMORPHONE HYDROCHLORIDE 1 MG/ML
0.5 INJECTION, SOLUTION INTRAMUSCULAR; INTRAVENOUS; SUBCUTANEOUS
Status: DISCONTINUED | OUTPATIENT
Start: 2022-01-23 | End: 2022-01-25

## 2022-01-23 RX ORDER — OXYCODONE HYDROCHLORIDE 5 MG/1
5-10 TABLET ORAL EVERY 4 HOURS PRN
Status: DISCONTINUED | OUTPATIENT
Start: 2022-01-23 | End: 2022-01-24

## 2022-01-23 RX ORDER — SODIUM CHLORIDE 9 MG/ML
INJECTION, SOLUTION INTRAVENOUS CONTINUOUS
Status: DISCONTINUED | OUTPATIENT
Start: 2022-01-23 | End: 2022-01-25 | Stop reason: HOSPADM

## 2022-01-23 RX ADMIN — HYDROMORPHONE HYDROCHLORIDE 0.2 MG: 0.2 INJECTION, SOLUTION INTRAMUSCULAR; INTRAVENOUS; SUBCUTANEOUS at 21:34

## 2022-01-23 RX ADMIN — HYDROMORPHONE HYDROCHLORIDE 0.5 MG: 1 INJECTION, SOLUTION INTRAMUSCULAR; INTRAVENOUS; SUBCUTANEOUS at 17:53

## 2022-01-23 RX ADMIN — SODIUM CHLORIDE: 9 INJECTION, SOLUTION INTRAVENOUS at 17:52

## 2022-01-23 RX ADMIN — KETOROLAC TROMETHAMINE 15 MG: 15 INJECTION, SOLUTION INTRAMUSCULAR; INTRAVENOUS at 15:40

## 2022-01-23 RX ADMIN — HYDROMORPHONE HYDROCHLORIDE 0.2 MG: 0.2 INJECTION, SOLUTION INTRAMUSCULAR; INTRAVENOUS; SUBCUTANEOUS at 16:33

## 2022-01-23 RX ADMIN — HYDROMORPHONE HYDROCHLORIDE 0.2 MG: 0.2 INJECTION, SOLUTION INTRAMUSCULAR; INTRAVENOUS; SUBCUTANEOUS at 15:32

## 2022-01-23 RX ADMIN — DIAZEPAM 10 MG: 10 TABLET ORAL at 20:29

## 2022-01-23 RX ADMIN — KETOROLAC TROMETHAMINE 15 MG: 15 INJECTION, SOLUTION INTRAMUSCULAR; INTRAVENOUS at 21:36

## 2022-01-23 RX ADMIN — ONDANSETRON 8 MG: 2 INJECTION INTRAMUSCULAR; INTRAVENOUS at 15:33

## 2022-01-23 RX ADMIN — SODIUM CHLORIDE 1000 ML: 9 INJECTION, SOLUTION INTRAVENOUS at 15:30

## 2022-01-23 ASSESSMENT — ENCOUNTER SYMPTOMS
ABDOMINAL PAIN: 1
NAUSEA: 1
VOMITING: 1

## 2022-01-23 NOTE — ED PROVIDER NOTES
[place] EMERGENCY DEPARTMENT ([facility])  1/23/22   ED 6   History     Chief Complaint   Patient presents with     Flank Pain     rt flank pain for the past 3-4 days     The history is provided by the patient and medical records.     Kimberly Laguna is a 71 year old female who presents with right sided flank pain that started 3-4 days ago. She states that this right-sided flank pain was kind of crampy at first and seems to really settle in her right low back. The pain does radiate up and down. She has had nausea and vomiting with this, vomited 3 times today just trying to walk from her house. She has difficulty finding a position of comfort. She fell 6 months ago, injured her back in the process and this pain seems to be in the same place. She also has had increased urinary frequency. No fevers but does feel very hot when she vomits. No hematuria. No history of abdominal surgery. No history of kidney stone or gallstone. Distant history of hepatitis and pneumonia. No respiratory symptoms. Patient has had 3 doses of COVID-19 vaccination, last dose 11/18/21.     Past Medical History  Past Medical History:   Diagnosis Date     Bipolar disorder (H) 10/26/2012     Problem list name updated by automated process. Provider to review     Hepatitis C      Herpes      Pneumonia      History reviewed. No pertinent surgical history.  atorvastatin (LIPITOR) 40 MG tablet  buPROPion (WELLBUTRIN SR) 100 MG 12 hr tablet  celecoxib (CELEBREX) 100 MG capsule  hydrOXYzine (ATARAX) 25 MG tablet  medical cannabis (Patient's own supply)  triamcinolone (ARISTOCORT HP) 0.5 % external cream      Allergies   Allergen Reactions     Tylenol Swelling     Swelling in eyes     Family History  History reviewed. No pertinent family history.  Social History   Social History     Tobacco Use     Smoking status: Never Smoker     Smokeless tobacco: Never Used   Substance Use Topics     Alcohol use: No     Drug use: Yes     Types: Marijuana      Comment: daily      Past medical history, past surgical history, medications, allergies, family history, and social history were reviewed with the patient. No additional pertinent items.       Review of Systems   Gastrointestinal: Positive for abdominal pain, nausea and vomiting.   All other systems reviewed and are negative.    A complete review of systems was performed with pertinent positives and negatives noted in the HPI, and all other systems negative.    Physical Exam   BP: (!) 186/80  Pulse: 99  Temp: 98.7  F (37.1  C)  Resp: 14  SpO2: 99 %  Physical Exam  Vitals and nursing note reviewed.   Constitutional:       Appearance: She is not diaphoretic.   HENT:      Head: Atraumatic.      Mouth/Throat:      Pharynx: No oropharyngeal exudate.   Eyes:      General: No scleral icterus.     Pupils: Pupils are equal, round, and reactive to light.   Cardiovascular:      Heart sounds: Normal heart sounds.   Pulmonary:      Effort: No respiratory distress.      Breath sounds: Normal breath sounds.   Abdominal:      General: Bowel sounds are normal.      Palpations: Abdomen is soft.      Tenderness: There is abdominal tenderness in the right upper quadrant. There is right CVA tenderness and guarding. There is no rebound.   Musculoskeletal:         General: No tenderness.      Cervical back: Neck supple.   Skin:     General: Skin is warm.      Findings: No rash.   Neurological:      General: No focal deficit present.      Mental Status: She is oriented to person, place, and time.         ED Course      Procedures       The medical record was reviewed and interpreted.  Current labs reviewed and interpreted.  Previous labs reviewed and interpreted.  Current images reviewed and interpreted: CT abdomen pelvis with renal cyst and right UPJ obstruction.  Previous images reviewed and interpreted: CT abdomen pelvis dated 2007.              Results for orders placed or performed during the hospital encounter of 01/23/22   CT  Abdomen Pelvis w/o Contrast     Status: None    Narrative    EXAM: CT ABDOMEN PELVIS W/O CONTRAST  LOCATION: [facility]  DATE/TIME: 1/23/2022 4:52 PM    INDICATION: Flank pain, kidney stone suspected  COMPARISON: 8/21/2007 and 8/23/2007 CT  TECHNIQUE: CT scan of the abdomen and pelvis was performed without IV contrast. Multiplanar reformats were obtained. Dose reduction techniques were used.  CONTRAST: None.    FINDINGS:   LOWER CHEST: Stable 4 mm right middle lobe and lingular subpleural nodules (series 5, images 14 and 31). Coronary atherosclerosis. Small sliding hiatal hernia.    HEPATOBILIARY: Normal.    PANCREAS: Normal.    SPLEEN: Normal.    ADRENAL GLANDS: Normal.    KIDNEYS/BLADDER: Mild dilation right renal pelvis and calyces to the level of the UPJ. This does not appear significantly changed. No renal or ureteral stone seen. It could represent a mild congenital UPJ obstruction.    Right renal 1 cm and 5 cm fluid density lesions compatible with cysts. No further imaging recommended.    BOWEL: Diverticulosis of the colon. No acute inflammatory change. No obstruction.     LYMPH NODES: Normal.    VASCULATURE: Aortoiliac atherosclerosis    PELVIC ORGANS: Normal.    MUSCULOSKELETAL: Extensive spinal degenerative change with loss of upper lumbar lordosis.      Impression    IMPRESSION:   1.  Mild dilation right renal pelvis and calyces. Consider congenital UPJ. If further evaluation needed. CT IVP recommended.  2.  Right renal 5 and 1 cm cysts.  3.  Coronary atherosclerosis.       Comprehensive metabolic panel     Status: Abnormal   Result Value Ref Range    Sodium 137 133 - 144 mmol/L    Potassium 3.6 3.4 - 5.3 mmol/L    Chloride 104 94 - 109 mmol/L    Carbon Dioxide (CO2) 26 20 - 32 mmol/L    Anion Gap 7 3 - 14 mmol/L    Urea Nitrogen 17 7 - 30 mg/dL    Creatinine 0.94 0.52 - 1.04 mg/dL    Calcium 9.7 8.5 - 10.1 mg/dL    Glucose 108 (H) 70 - 99 mg/dL    Alkaline  Phosphatase 79 40 - 150 U/L    AST 13 0 - 45 U/L    ALT 20 0 - 50 U/L    Protein Total 7.9 6.8 - 8.8 g/dL    Albumin 3.6 3.4 - 5.0 g/dL    Bilirubin Total 0.3 0.2 - 1.3 mg/dL    GFR Estimate 65 >60 mL/min/1.73m2   Lipase     Status: Normal   Result Value Ref Range    Lipase 154 73 - 393 U/L   Lactic acid whole blood     Status: Normal   Result Value Ref Range    Lactic Acid 1.7 0.7 - 2.0 mmol/L   UA with Microscopic reflex to Culture     Status: Abnormal    Specimen: Urine, Midstream   Result Value Ref Range    Color Urine Light Yellow Colorless, Straw, Light Yellow, Yellow    Appearance Urine Clear Clear    Glucose Urine Negative Negative mg/dL    Bilirubin Urine Negative Negative    Ketones Urine Negative Negative mg/dL    Specific Gravity Urine 1.011 1.003 - 1.035    Blood Urine Small (A) Negative    pH Urine 6.5 5.0 - 7.0    Protein Albumin Urine 100  (A) Negative mg/dL    Urobilinogen Urine Normal Normal, 2.0 mg/dL    Nitrite Urine Negative Negative    Leukocyte Esterase Urine Trace (A) Negative    Mucus Urine Present (A) None Seen /LPF    RBC Urine 5 (H) <=2 /HPF    WBC Urine 3 <=5 /HPF    Squamous Epithelials Urine <1 <=1 /HPF    Narrative    Urine Culture not indicated   CBC with platelets and differential     Status: None   Result Value Ref Range    WBC Count 10.7 4.0 - 11.0 10e3/uL    RBC Count 4.58 3.80 - 5.20 10e6/uL    Hemoglobin 13.1 11.7 - 15.7 g/dL    Hematocrit 39.4 35.0 - 47.0 %    MCV 86 78 - 100 fL    MCH 28.6 26.5 - 33.0 pg    MCHC 33.2 31.5 - 36.5 g/dL    RDW 12.3 10.0 - 15.0 %    Platelet Count 364 150 - 450 10e3/uL    % Neutrophils 71 %    % Lymphocytes 20 %    % Monocytes 7 %    % Eosinophils 1 %    % Basophils 1 %    % Immature Granulocytes 0 %    NRBCs per 100 WBC 0 <1 /100    Absolute Neutrophils 7.7 1.6 - 8.3 10e3/uL    Absolute Lymphocytes 2.1 0.8 - 5.3 10e3/uL    Absolute Monocytes 0.7 0.0 - 1.3 10e3/uL    Absolute Eosinophils 0.1 0.0 - 0.7 10e3/uL    Absolute Basophils 0.1 0.0 - 0.2  10e3/uL    Absolute Immature Granulocytes 0.0 <=0.4 10e3/uL    Absolute NRBCs 0.0 10e3/uL   CBC with platelets differential     Status: None    Narrative    The following orders were created for panel order CBC with platelets differential.  Procedure                               Abnormality         Status                     ---------                               -----------         ------                     CBC with platelets and d...[[id number]]                      Final result                 Please view results for these tests on the individual orders.     Medications   0.9% sodium chloride BOLUS (0 mLs Intravenous Stopped 1/23/22 1740)     Followed by   sodium chloride 0.9% infusion ( Intravenous New Bag 1/23/22 1752)   ondansetron (ZOFRAN) 2 MG/ML injection (  Canceled Entry 1/23/22 1542)   ketorolac (TORADOL) injection 15 mg (15 mg Intravenous Given 1/23/22 1540)   HYDROmorphone (DILAUDID) injection 0.2 mg (0.2 mg Intravenous Given 1/23/22 1532)   ondansetron (ZOFRAN) injection 8 mg (8 mg Intravenous Given 1/23/22 1533)   HYDROmorphone (DILAUDID) injection 0.2 mg (0.2 mg Intravenous Given 1/23/22 1633)   HYDROmorphone (PF) (DILAUDID) injection 0.5 mg (0.5 mg Intravenous Given 1/23/22 1753)        Assessments & Plan (with Medical Decision Making)   71-year-old female with a history of hepatitis C, pneumonia with pleurisy, and cannabis abuse presenting due to nontraumatic right flank and mid abdominal pain of several days duration.  Initial differential diagnosis includes ureterolithiasis, pyelonephritis, cholelithiasis or cholecystitis, pancreatitis, colitis, small bowel obstruction, lower lobe pneumonia, duodenitis, less likely intestinal ischemia, gastroenteritis.  On exam she is hypertensive her other vital signs are normal she is writhing on the cart in obvious discomfort, tearful.  She has tenderness of the right costovertebral angle and right mid abdomen without peritoneal signs.  Her spine is  nontender her lungs are clear to the bases, and her physical exam is otherwise unremarkable as documented above.  Was treated with IV fluids and analgesics and labs were obtained.  Required serial doses of analgesics to obtain adequate pain control.  Her creatinine and other diagnostic studies are unremarkable, urinalysis no definite sign of infection.  Imaging reveals 2 large right renal cysts and some evidence of UPJ obstruction but no definite stone, further work-up with CT IVP potentially recommended per radiology.  I spoke with the patient, she does not have a surgical abdomen but is still uncomfortable.  We will admit her to the observation unit for IV fluids, analgesics, urology consultation.  I have spoken with the staff there and with the patient and we are all in agreement.  She appears stable for transfer.    I have reviewed the nursing notes. I have reviewed the findings, diagnosis, plan and need for follow up with the patient.    New Prescriptions    No medications on file       Final diagnoses:   Right flank pain   UPJ (ureteropelvic junction) obstruction     I, Fariha Crane, am serving as a trained medical scribe to document services personally performed by Fausto Baeza MD based on the provider's statements to me on January 23, 2022.  This document has been checked and approved by the attending provider.    I, Fausto Baeza MD, was physically present and have reviewed and verified the accuracy of this note documented by Fariha Crane, medical scribe.      Fausto Baeza MD       [facility] EMERGENCY DEPARTMENT  1/23/2022     Fausto Baeza MD  01/23/22 1828

## 2022-01-23 NOTE — LETTER
Transition Communication Hand-off for Care Transitions to Next Level of Care Provider    Name: Kimberly Laguna  : 1950  MRN #: [id number]  Primary Care Provider: Corey Brooks     Primary Clinic: [address] [address]     Reason for Hospitalization:  UPJ (ureteropelvic junction) obstruction [N13.5]  Right flank pain [R10.9]  Admit Date/Time: 2022  3:05 PM  Discharge Date: 2022  Payor Source: Payor: MEDICARE / Plan: MEDICARE / Product Type: Medicare /      Reason for Communication Hand-off Referral: Other  continuity of care  Discharge Plan: per attached AVS  Concern for non-adherence with plan of care:   Y/N no  Discharge Needs Assessment:  Needs      Most Recent Value   Equipment Currently Used at Home cane, straight        Follow-up plan:  No future appointments.    Any outstanding tests or procedures:  none          Key Recommendations:  See attached AVS    Fco Vance RN    AVS/Discharge Summary is the source of truth; this is a helpful guide for improved communication of patient story

## 2022-01-24 LAB
ANION GAP SERPL CALCULATED.3IONS-SCNC: 5 MMOL/L (ref 3–14)
BUN SERPL-MCNC: 15 MG/DL (ref 7–30)
CALCIUM SERPL-MCNC: 8.7 MG/DL (ref 8.5–10.1)
CHLORIDE BLD-SCNC: 110 MMOL/L (ref 94–109)
CO2 SERPL-SCNC: 26 MMOL/L (ref 20–32)
CREAT SERPL-MCNC: 0.95 MG/DL (ref 0.52–1.04)
ERYTHROCYTE [DISTWIDTH] IN BLOOD BY AUTOMATED COUNT: 12.7 % (ref 10–15)
GFR SERPL CREATININE-BSD FRML MDRD: 64 ML/MIN/1.73M2
GLUCOSE BLD-MCNC: 102 MG/DL (ref 70–99)
GLUCOSE BLDC GLUCOMTR-MCNC: 93 MG/DL (ref 70–99)
HCT VFR BLD AUTO: 34.7 % (ref 35–47)
HGB BLD-MCNC: 11.2 G/DL (ref 11.7–15.7)
MCH RBC QN AUTO: 28.4 PG (ref 26.5–33)
MCHC RBC AUTO-ENTMCNC: 32.3 G/DL (ref 31.5–36.5)
MCV RBC AUTO: 88 FL (ref 78–100)
PLATELET # BLD AUTO: 287 10E3/UL (ref 150–450)
POTASSIUM BLD-SCNC: 3.8 MMOL/L (ref 3.4–5.3)
RBC # BLD AUTO: 3.95 10E6/UL (ref 3.8–5.2)
SODIUM SERPL-SCNC: 141 MMOL/L (ref 133–144)
WBC # BLD AUTO: 9.6 10E3/UL (ref 4–11)

## 2022-01-24 PROCEDURE — 250N000011 HC RX IP 250 OP 636: Performed by: PHYSICIAN ASSISTANT

## 2022-01-24 PROCEDURE — 250N000013 HC RX MED GY IP 250 OP 250 PS 637: Performed by: NURSE PRACTITIONER

## 2022-01-24 PROCEDURE — G0378 HOSPITAL OBSERVATION PER HR: HCPCS

## 2022-01-24 PROCEDURE — 250N000013 HC RX MED GY IP 250 OP 250 PS 637: Performed by: PHYSICIAN ASSISTANT

## 2022-01-24 PROCEDURE — 258N000003 HC RX IP 258 OP 636: Performed by: FAMILY MEDICINE

## 2022-01-24 PROCEDURE — 96376 TX/PRO/DX INJ SAME DRUG ADON: CPT

## 2022-01-24 PROCEDURE — 82962 GLUCOSE BLOOD TEST: CPT

## 2022-01-24 PROCEDURE — 82310 ASSAY OF CALCIUM: CPT | Performed by: NURSE PRACTITIONER

## 2022-01-24 PROCEDURE — 36415 COLL VENOUS BLD VENIPUNCTURE: CPT | Performed by: NURSE PRACTITIONER

## 2022-01-24 PROCEDURE — 250N000011 HC RX IP 250 OP 636: Performed by: NURSE PRACTITIONER

## 2022-01-24 PROCEDURE — 85027 COMPLETE CBC AUTOMATED: CPT | Performed by: NURSE PRACTITIONER

## 2022-01-24 PROCEDURE — 99222 1ST HOSP IP/OBS MODERATE 55: CPT | Performed by: STUDENT IN AN ORGANIZED HEALTH CARE EDUCATION/TRAINING PROGRAM

## 2022-01-24 PROCEDURE — 96375 TX/PRO/DX INJ NEW DRUG ADDON: CPT

## 2022-01-24 RX ORDER — OXYCODONE HYDROCHLORIDE 5 MG/1
5-10 TABLET ORAL EVERY 4 HOURS
Status: DISCONTINUED | OUTPATIENT
Start: 2022-01-24 | End: 2022-01-25

## 2022-01-24 RX ORDER — PROCHLORPERAZINE 25 MG
12.5 SUPPOSITORY, RECTAL RECTAL EVERY 6 HOURS
Status: DISCONTINUED | OUTPATIENT
Start: 2022-01-24 | End: 2022-01-25 | Stop reason: HOSPADM

## 2022-01-24 RX ORDER — POLYETHYLENE GLYCOL 3350 17 G/17G
17 POWDER, FOR SOLUTION ORAL DAILY
Status: DISCONTINUED | OUTPATIENT
Start: 2022-01-25 | End: 2022-01-25

## 2022-01-24 RX ORDER — AMOXICILLIN 250 MG
1 CAPSULE ORAL AT BEDTIME
Status: DISCONTINUED | OUTPATIENT
Start: 2022-01-24 | End: 2022-01-25

## 2022-01-24 RX ORDER — PROCHLORPERAZINE MALEATE 5 MG
5 TABLET ORAL EVERY 6 HOURS
Status: DISCONTINUED | OUTPATIENT
Start: 2022-01-24 | End: 2022-01-25 | Stop reason: HOSPADM

## 2022-01-24 RX ORDER — AMLODIPINE BESYLATE 5 MG/1
5 TABLET ORAL DAILY
Status: DISCONTINUED | OUTPATIENT
Start: 2022-01-24 | End: 2022-01-25 | Stop reason: HOSPADM

## 2022-01-24 RX ORDER — KETOROLAC TROMETHAMINE 30 MG/ML
15 INJECTION, SOLUTION INTRAMUSCULAR; INTRAVENOUS ONCE
Status: COMPLETED | OUTPATIENT
Start: 2022-01-24 | End: 2022-01-24

## 2022-01-24 RX ORDER — LIDOCAINE 4 G/G
1 PATCH TOPICAL
Status: DISCONTINUED | OUTPATIENT
Start: 2022-01-24 | End: 2022-01-25 | Stop reason: HOSPADM

## 2022-01-24 RX ADMIN — METHOCARBAMOL 500 MG: 500 TABLET ORAL at 15:58

## 2022-01-24 RX ADMIN — HYDROXYZINE HYDROCHLORIDE 50 MG: 25 TABLET, FILM COATED ORAL at 22:10

## 2022-01-24 RX ADMIN — LABETALOL HYDROCHLORIDE 5 MG: 5 INJECTION, SOLUTION INTRAVENOUS at 00:56

## 2022-01-24 RX ADMIN — HYDROXYZINE HYDROCHLORIDE 50 MG: 25 TABLET, FILM COATED ORAL at 13:21

## 2022-01-24 RX ADMIN — KETOROLAC TROMETHAMINE 15 MG: 30 INJECTION, SOLUTION INTRAMUSCULAR at 08:31

## 2022-01-24 RX ADMIN — OXYCODONE HYDROCHLORIDE 5 MG: 5 TABLET ORAL at 12:51

## 2022-01-24 RX ADMIN — OXYCODONE HYDROCHLORIDE 5 MG: 5 TABLET ORAL at 06:44

## 2022-01-24 RX ADMIN — SODIUM CHLORIDE: 9 INJECTION, SOLUTION INTRAVENOUS at 16:09

## 2022-01-24 RX ADMIN — METHOCARBAMOL 500 MG: 500 TABLET ORAL at 11:56

## 2022-01-24 RX ADMIN — METHOCARBAMOL 500 MG: 500 TABLET ORAL at 08:32

## 2022-01-24 RX ADMIN — ONDANSETRON 4 MG: 2 INJECTION INTRAMUSCULAR; INTRAVENOUS at 05:49

## 2022-01-24 RX ADMIN — SODIUM CHLORIDE: 9 INJECTION, SOLUTION INTRAVENOUS at 00:30

## 2022-01-24 RX ADMIN — OXYCODONE HYDROCHLORIDE 10 MG: 5 TABLET ORAL at 23:36

## 2022-01-24 RX ADMIN — SODIUM CHLORIDE: 9 INJECTION, SOLUTION INTRAVENOUS at 08:40

## 2022-01-24 RX ADMIN — AMLODIPINE BESYLATE 5 MG: 5 TABLET ORAL at 18:50

## 2022-01-24 RX ADMIN — PROCHLORPERAZINE EDISYLATE 5 MG: 5 INJECTION INTRAMUSCULAR; INTRAVENOUS at 19:43

## 2022-01-24 RX ADMIN — ONDANSETRON 4 MG: 2 INJECTION INTRAMUSCULAR; INTRAVENOUS at 22:09

## 2022-01-24 RX ADMIN — HYDROMORPHONE HYDROCHLORIDE 0.5 MG: 1 INJECTION, SOLUTION INTRAMUSCULAR; INTRAVENOUS; SUBCUTANEOUS at 12:02

## 2022-01-24 RX ADMIN — OXYCODONE HYDROCHLORIDE 5 MG: 5 TABLET ORAL at 01:32

## 2022-01-24 RX ADMIN — ONDANSETRON 4 MG: 2 INJECTION INTRAMUSCULAR; INTRAVENOUS at 12:51

## 2022-01-24 RX ADMIN — HYDROMORPHONE HYDROCHLORIDE 0.5 MG: 1 INJECTION, SOLUTION INTRAMUSCULAR; INTRAVENOUS; SUBCUTANEOUS at 18:50

## 2022-01-24 RX ADMIN — OXYCODONE HYDROCHLORIDE 10 MG: 5 TABLET ORAL at 19:49

## 2022-01-24 RX ADMIN — BUPROPION HYDROCHLORIDE 100 MG: 100 TABLET, EXTENDED RELEASE ORAL at 19:50

## 2022-01-24 RX ADMIN — HYDROMORPHONE HYDROCHLORIDE 0.5 MG: 1 INJECTION, SOLUTION INTRAMUSCULAR; INTRAVENOUS; SUBCUTANEOUS at 05:44

## 2022-01-24 RX ADMIN — METHOCARBAMOL 500 MG: 500 TABLET ORAL at 19:50

## 2022-01-24 RX ADMIN — BUPROPION HYDROCHLORIDE 100 MG: 100 TABLET, EXTENDED RELEASE ORAL at 08:32

## 2022-01-24 RX ADMIN — DOCUSATE SODIUM 50 MG AND SENNOSIDES 8.6 MG 1 TABLET: 8.6; 5 TABLET, FILM COATED ORAL at 22:12

## 2022-01-24 RX ADMIN — LIDOCAINE 1 PATCH: 560 PATCH PERCUTANEOUS; TOPICAL; TRANSDERMAL at 08:31

## 2022-01-24 RX ADMIN — ONDANSETRON 4 MG: 2 INJECTION INTRAMUSCULAR; INTRAVENOUS at 00:33

## 2022-01-24 RX ADMIN — KETOROLAC TROMETHAMINE 15 MG: 30 INJECTION, SOLUTION INTRAMUSCULAR at 14:08

## 2022-01-24 RX ADMIN — HYDROMORPHONE HYDROCHLORIDE 0.5 MG: 1 INJECTION, SOLUTION INTRAMUSCULAR; INTRAVENOUS; SUBCUTANEOUS at 00:46

## 2022-01-24 NOTE — UTILIZATION REVIEW
"  Admission Status; Secondary Review Determination         Under the authority of the Utilization Management Committee, the utilization review process indicated a secondary review on the above patient.  The review outcome is based on review of the medical records, discussions with staff, and applying clinical experience noted on the date of the review.        (x) Observation Status Appropriate - This patient does not meet hospital inpatient criteria and is placed in observation status. If this patient's primary payer is Medicare and was admitted as an inpatient, Condition Code 44 should be used and patient status changed to \"observation\".     RATIONALE FOR DETERMINATION   The patient is a 71-year-old female who was admitted on 1/23/2022.  She came to the emergency room with complaints of crampy right-sided flank pain.  She also had nausea and vomiting.  She has some increased urinary frequency.  Urology saw her and did not find any urologic underlying issue.  CT did show mild dilatation of the right renal pelvis and calyces with consideration for congenital UPJ.  Urology did not feel that further evaluation was required.  The patient is on pain medication.  I did discuss case with the treating hospitalist and she felt that the patient was improving and likely will be discharged in the morning.  There is not clear diagnostic information to explain her symptoms.  Based on diagnosis and treatment, recommend continuation of observation status.      The severity of illness, intensity of service provided, expected LOS and risk for adverse outcome make the care complex, high risk and appropriate for hospital admission.        The information on this document is developed by the utilization review team in order for the business office to ensure compliance.  This only denotes the appropriateness of proper admission status and does not reflect the quality of care rendered.         The definitions of Inpatient Status and " Observation Status used in making the determination above are those provided in the CMS Coverage Manual, Chapter 1 and Chapter 6, section 70.4.      Sincerely,     Reynaldo Damian MD  Physician Advisor  Utilization Review/ Case Management  [facility].

## 2022-01-24 NOTE — PROGRESS NOTES
Observation goals  PRIOR TO DISCHARGE       Comments:   -diagnostic tests and consults completed and resulted: not met      -vital signs normal or at patient baseline: not met   BP elevated: 182/90  Labetalol 5mg given.  BP on reassessment: 159/77     -tolerating oral intake to maintain hydration: met      -adequate pain control on oral analgesics: not met      -returns to baseline functional status: not met      -safe disposition plan has been identified: not met

## 2022-01-24 NOTE — PLAN OF CARE
OBSERVATION GOALS: PRIOR TO DISCHARGE    - Diagnostic tests and consults completed and resulted:  Not met  - Vital signs normal or at patient baseline:  Hypertensive; stable on RA  - Tolerating oral intake to maintain hydration:  Met  - Adequate pain control on oral analgesics:  In process  - Returns to baseline functional status:  Not met  - Safe disposition plan has been identified:  Not met    Nurse to notify provider when observation goals have been met and patient is ready for discharge.    BP (!) 155/74   Pulse 92   Temp 98.9  F (37.2  C)   Resp 18   Wt 81.2 kg (179 lb 0.2 oz)   SpO2 100%   BMI 30.94 kg/m
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
## 2022-01-24 NOTE — PROGRESS NOTES
"Emergency Medicine Observation Attending note    The patient was independently seen and examined by me. The chart, vital signs, and labs were reviewed. The patient's findings were discussed with the KRISTOFER on the observation unit, and I agree with the findings of the note and the plan.    72 yo female, admitted to ED OBS after presenting to the ER with complaint of crampy right sided flank pain x 3-4 days. She has had N/V with this as well. She also noted increased urinary frequency. No recent trauma.  No fevers, hematuria. CT was done which showed, \"Mild dilation right renal pelvis and calyces. Consider congenital UPJ. If further evaluation needed. CT IVP recommended.\" No other clear cause for her sx identified by labs/exam in the ED. She was quite uncomfortable while in the ED, so was admitted for pain control and urology consult. This morning she states she is feeling alright, as long as she doesn't move - which makes the pain hurt more. No loss of bowel/bladder control, no LE numbness/weakness.    BP (!) 149/67 (BP Location: Right arm)   Pulse 80   Temp 98.5  F (36.9  C)   Resp 18   Wt 81.2 kg (179 lb 0.2 oz)   SpO2 97%   BMI 30.94 kg/m        Exam:  General: awake, alert, NAD  HEENT: NC/AT  Neck: supple  Lungs: CTA-B  Heart: RRR, no M/R/G  Abd: soft, ND/NT  Ext: non-tender, no edema  Back: right low back tenderness with palpation, CMS is intact distally with nl/equal strength and sensation throughout      Assessment/plan:  1. Flank pain - treating symptomatically with robaxin, oxycodone, dilaudid prn. Urology consult today. Will add lidoderm and try one dose toradol.  2. N/V - ? Pain related? No c/o vomiting this am. Antiemetics here.   "

## 2022-01-24 NOTE — PLAN OF CARE
Observation goals  PRIOR TO DISCHARGE            -diagnostic tests and consults completed and resulted: not met      -vital signs normal or at patient baseline: not met   BP (!) 149/67 (BP Location: Right arm)   Pulse 80   Temp 98.5  F (36.9  C)   Resp 18   Wt 81.2 kg (179 lb 0.2 oz)   SpO2 97%   BMI 30.94 kg/m       -tolerating oral intake to maintain hydration: met      -adequate pain control on oral analgesics: not met      -returns to baseline functional status: not met      -safe disposition plan has been identified: not met    VIII - intact hearing                                                                             IX, X - symmetrical palate                                                                  XI - symmetrical shoulder shrug                                                       XII - midline tongue without atrophy or fasciculation     Motor function  Strength: 5/5 RUE, 5/5 RLE; Normal bulk and tone. 5/5 LUE, 5/5  LLE     Sensory function Intact to touch, pin, vibration, proprioception     Cerebellar Intact finger-nose-finger testing. Intact heel-shin testing. (+)resting tremor b/l hands L > R, facial tremor present in lips, tongue. (+) dysdiadochokinesia present with finger and toe tapping b/l. Reflex function 3/4 symmetric throughout . Downgoing plantar response bilaterally. (-)Gonzales's sign bilaterally    Gait                  Did not assess            LABS:    CBC:   Recent Labs      11/01/18 1941   WBC  7.7   HGB  14.7   PLT  152     BMP:    Recent Labs      11/01/18 1941   NA  143   K  4.1   CL  105   CO2  27   BUN  19   CREATININE  0.8   GLUCOSE  113*         Lab Results   Component Value Date    CHOL 137 07/03/2018    HDL 39 07/03/2018    TRIG 118 07/03/2018    ALT <5 (L) 11/01/2018    AST 22 11/01/2018    TSH 1.400 08/01/2017    INR 1.07 11/01/2018    [id number] 1108 (H) 03/30/2016         IMAGING:   CT head - mild cortical atrophy. Area of encephalomalacia inferior right frontal lobe. Carotid doppler US - R ICA calcified plaque < 50% stenosis. Absent flow R vertebral artery. MRI brain (5/16): areas of encephalomalacia w cortical gliosis in inferior R frontal lobe. Evidence of remote hemorrhage in subdural space over right frontal lobe convexity. 2 D echo - pending          ASSESSMENT:  1.  This is a 81 y.o. male presenting after episode of LOC being described as standing up, feeling warm, lightheaded then sitting

## 2022-01-24 NOTE — PROGRESS NOTES
Care Management Follow Up    Length of Stay (days): 0    Expected Discharge Date:  TBD     Concerns to be Addressed:     BALES Document  Patient plan of care discussed at interdisciplinary rounds: Yes    Anticipated Discharge Disposition:  TBD     Anticipated Discharge Services:    Anticipated Discharge DME:      Patient/family educated on Medicare website which has current facility and service quality ratings:  N/A  Education Provided on the Discharge Plan:  N/A  Patient/Family in Agreement with the Plan:  N/A    Referrals Placed by CM/SW:  Not at this time  Private pay costs discussed: insurance costs out of pocket expenses, co-pays and deductibles    Additional Information:    1043 XIAO met with Kimberly at bedside due to her Observation status. XIAO explained the Medicare Outpatient Observation Notice (MOON) and why pt was receiving it. SW addressed questions and concerns, then asked Kimberly to sign document. Pt signed BALES at 1046.  [phone][facility] at 1053,  then placed BALES in Kimberly's chart.    XIAO will continue to follow as needed.    HERI Martínez, LGSW  ED/OBS   [facility]  Phone: [phone]  Pager: [phone]  Fax: [phone]     On-call pager, [phone], 4:00 pm to midnight

## 2022-01-24 NOTE — PLAN OF CARE
"   Observation goals  PRIOR TO DISCHARGE       Comments:   -diagnostic tests and consults completed and resulted: not met     -vital signs normal or at patient baseline: not met     -tolerating oral intake to maintain hydration: met     -adequate pain control on oral analgesics: not met     -returns to baseline functional status: not met     -safe disposition plan has been identified: not met     Patient had PTA meds in her purse. Nursing asked if meds could be locked in Security per policy. Patient states she needs to take her Hydroxyzine to help her sleep. Nursing explained that patient could still receive PTA meds through providers orders/MAR. Patient preceded to open PTA pill bottle with different colors, sizes of pills mixed together. She took #4 round green pills with imprints E 160. Patient states they were Hydroxyzine\"25 or 50\"mg. This writer looked up pills and verified source with pharmacy and appears as though patient consumed Hydroxyzine 100mg PO around midnight. Provider updated. Patients PTA meds were then locked up with Security. Patient sleeping between cares, in no acute distress.        "
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
## 2022-01-24 NOTE — ED NOTES
[facility]   ED Nurse to Floor Handoff     Kimberly Laguna is a 71 year old female who speaks English and lives alone,  in a home  They arrived in the ED by car from home    ED Chief Complaint: Flank Pain (rt flank pain for the past 3-4 days)    ED Dx;   Final diagnoses:   Right flank pain   UPJ (ureteropelvic junction) obstruction         Needed?: No    Allergies:   Allergies   Allergen Reactions     Tylenol Swelling     Swelling in eyes   .  Past Medical Hx:   Past Medical History:   Diagnosis Date     Bipolar disorder (H) 10/26/2012     Problem list name updated by automated process. Provider to review     Hepatitis C      Herpes      Pneumonia       Baseline Mental status: WDL  Current Mental Status changes: at basesline    Infection present or suspected this encounter: no  Sepsis suspected: No  Isolation type: No active isolations  Patient tested for COVID 19 prior to admission: YES     Activity level - Baseline/Home:  Independent  Activity Level - Current:   Independent    Bariatric equipment needed?: No    In the ED these meds were given:   Medications   0.9% sodium chloride BOLUS (0 mLs Intravenous Stopped 1/23/22 1740)     Followed by   sodium chloride 0.9% infusion ( Intravenous New Bag 1/23/22 1752)   ondansetron (ZOFRAN) 2 MG/ML injection (  Canceled Entry 1/23/22 1542)   diazepam (VALIUM) tablet 10 mg (has no administration in time range)   ketorolac (TORADOL) injection 15 mg (15 mg Intravenous Given 1/23/22 1540)   HYDROmorphone (DILAUDID) injection 0.2 mg (0.2 mg Intravenous Given 1/23/22 1532)   ondansetron (ZOFRAN) injection 8 mg (8 mg Intravenous Given 1/23/22 1533)   HYDROmorphone (DILAUDID) injection 0.2 mg (0.2 mg Intravenous Given 1/23/22 1633)   HYDROmorphone (PF) (DILAUDID) injection 0.5 mg (0.5 mg Intravenous Given 1/23/22 1753)       Drips running?  No    Home pump  No    Current LDAs  Peripheral IV 01/23/22 Left Upper forearm  (Active)   Number of days: 0       Labs results:   Labs Ordered and Resulted from Time of ED Arrival to Time of ED Departure   COMPREHENSIVE METABOLIC PANEL - Abnormal       Result Value    Sodium 137      Potassium 3.6      Chloride 104      Carbon Dioxide (CO2) 26      Anion Gap 7      Urea Nitrogen 17      Creatinine 0.94      Calcium 9.7      Glucose 108 (*)     Alkaline Phosphatase 79      AST 13      ALT 20      Protein Total 7.9      Albumin 3.6      Bilirubin Total 0.3      GFR Estimate 65     ROUTINE UA WITH MICROSCOPIC REFLEX TO CULTURE - Abnormal    Color Urine Light Yellow      Appearance Urine Clear      Glucose Urine Negative      Bilirubin Urine Negative      Ketones Urine Negative      Specific Gravity Urine 1.011      Blood Urine Small (*)     pH Urine 6.5      Protein Albumin Urine 100  (*)     Urobilinogen Urine Normal      Nitrite Urine Negative      Leukocyte Esterase Urine Trace (*)     Mucus Urine Present (*)     RBC Urine 5 (*)     WBC Urine 3      Squamous Epithelials Urine <1     LIPASE - Normal    Lipase 154     LACTIC ACID WHOLE BLOOD - Normal    Lactic Acid 1.7     CBC WITH PLATELETS AND DIFFERENTIAL    WBC Count 10.7      RBC Count 4.58      Hemoglobin 13.1      Hematocrit 39.4      MCV 86      MCH 28.6      MCHC 33.2      RDW 12.3      Platelet Count 364      % Neutrophils 71      % Lymphocytes 20      % Monocytes 7      % Eosinophils 1      % Basophils 1      % Immature Granulocytes 0      NRBCs per 100 WBC 0      Absolute Neutrophils 7.7      Absolute Lymphocytes 2.1      Absolute Monocytes 0.7      Absolute Eosinophils 0.1      Absolute Basophils 0.1      Absolute Immature Granulocytes 0.0      Absolute NRBCs 0.0     COVID-19 VIRUS (CORONAVIRUS) BY PCR       Imaging Studies:   Recent Results (from the past 24 hour(s))   CT Abdomen Pelvis w/o Contrast    Narrative    EXAM: CT ABDOMEN PELVIS W/O CONTRAST  LOCATION: [facility]  DATE/TIME:  1/23/2022 4:52 PM    INDICATION: Flank pain, kidney stone suspected  COMPARISON: 8/21/2007 and 8/23/2007 CT  TECHNIQUE: CT scan of the abdomen and pelvis was performed without IV contrast. Multiplanar reformats were obtained. Dose reduction techniques were used.  CONTRAST: None.    FINDINGS:   LOWER CHEST: Stable 4 mm right middle lobe and lingular subpleural nodules (series 5, images 14 and 31). Coronary atherosclerosis. Small sliding hiatal hernia.    HEPATOBILIARY: Normal.    PANCREAS: Normal.    SPLEEN: Normal.    ADRENAL GLANDS: Normal.    KIDNEYS/BLADDER: Mild dilation right renal pelvis and calyces to the level of the UPJ. This does not appear significantly changed. No renal or ureteral stone seen. It could represent a mild congenital UPJ obstruction.    Right renal 1 cm and 5 cm fluid density lesions compatible with cysts. No further imaging recommended.    BOWEL: Diverticulosis of the colon. No acute inflammatory change. No obstruction.     LYMPH NODES: Normal.    VASCULATURE: Aortoiliac atherosclerosis    PELVIC ORGANS: Normal.    MUSCULOSKELETAL: Extensive spinal degenerative change with loss of upper lumbar lordosis.      Impression    IMPRESSION:   1.  Mild dilation right renal pelvis and calyces. Consider congenital UPJ. If further evaluation needed. CT IVP recommended.  2.  Right renal 5 and 1 cm cysts.  3.  Coronary atherosclerosis.           Recent vital signs:   BP (!) 197/93   Pulse 88   Temp 98.7  F (37.1  C) (Oral)   Resp 14   SpO2 98%             Cardiac Rhythm: NA  Pt needs tele? No  Skin/wound Issues: None    Code Status: Full Code    Pain control: fair    Nausea control: good    Abnormal labs/tests/findings requiring intervention: NA    Family present during ED course? No   Family Comments/Social Situation comments: NA    Tasks needing completion: None    Nannette Bergeron, RN  [phone] [place] [facility]  [phone] [facility] ED

## 2022-01-24 NOTE — PLAN OF CARE
Observation goals  PRIOR TO DISCHARGE             -diagnostic tests and consults completed and resulted: not met      -vital signs normal or at patient baseline: not met   BP (!) 166/90 (BP Location: Right arm)   Pulse 85   Temp 98.5  F (36.9  C)   Resp 18   Wt 81.2 kg (179 lb 0.2 oz)   SpO2 99%   BMI 30.94 kg/m       -tolerating oral intake to maintain hydration: met      -adequate pain control on oral analgesics: not met      -returns to baseline functional status: not met      -safe disposition plan has been identified: not met

## 2022-01-24 NOTE — PROGRESS NOTES
Patient transferred to room via cart from [place]. Vital signs obtained. Provider aware. Will continue with plan of care.  Fanny Isbell RN on 1/23/2022 at 10:09 PM

## 2022-01-25 VITALS
SYSTOLIC BLOOD PRESSURE: 186 MMHG | HEART RATE: 91 BPM | BODY MASS INDEX: 30.94 KG/M2 | RESPIRATION RATE: 17 BRPM | OXYGEN SATURATION: 98 % | TEMPERATURE: 98.4 F | DIASTOLIC BLOOD PRESSURE: 83 MMHG | WEIGHT: 179.01 LBS

## 2022-01-25 LAB
ANION GAP SERPL CALCULATED.3IONS-SCNC: 7 MMOL/L (ref 3–14)
BUN SERPL-MCNC: 10 MG/DL (ref 7–30)
CALCIUM SERPL-MCNC: 8.5 MG/DL (ref 8.5–10.1)
CHLORIDE BLD-SCNC: 110 MMOL/L (ref 94–109)
CO2 SERPL-SCNC: 23 MMOL/L (ref 20–32)
CREAT SERPL-MCNC: 0.84 MG/DL (ref 0.52–1.04)
ERYTHROCYTE [DISTWIDTH] IN BLOOD BY AUTOMATED COUNT: 12.4 % (ref 10–15)
GFR SERPL CREATININE-BSD FRML MDRD: 74 ML/MIN/1.73M2
GLUCOSE BLD-MCNC: 112 MG/DL (ref 70–99)
HCT VFR BLD AUTO: 35.2 % (ref 35–47)
HGB BLD-MCNC: 11.4 G/DL (ref 11.7–15.7)
MCH RBC QN AUTO: 28.6 PG (ref 26.5–33)
MCHC RBC AUTO-ENTMCNC: 32.4 G/DL (ref 31.5–36.5)
MCV RBC AUTO: 88 FL (ref 78–100)
PLATELET # BLD AUTO: 274 10E3/UL (ref 150–450)
POTASSIUM BLD-SCNC: 3.6 MMOL/L (ref 3.4–5.3)
RBC # BLD AUTO: 3.98 10E6/UL (ref 3.8–5.2)
SODIUM SERPL-SCNC: 140 MMOL/L (ref 133–144)
WBC # BLD AUTO: 12.3 10E3/UL (ref 4–11)

## 2022-01-25 PROCEDURE — G0378 HOSPITAL OBSERVATION PER HR: HCPCS

## 2022-01-25 PROCEDURE — 250N000011 HC RX IP 250 OP 636: Performed by: NURSE PRACTITIONER

## 2022-01-25 PROCEDURE — 80061 LIPID PANEL: CPT

## 2022-01-25 PROCEDURE — 83036 HEMOGLOBIN GLYCOSYLATED A1C: CPT

## 2022-01-25 PROCEDURE — 80048 BASIC METABOLIC PNL TOTAL CA: CPT | Performed by: PHYSICIAN ASSISTANT

## 2022-01-25 PROCEDURE — 85014 HEMATOCRIT: CPT | Performed by: PHYSICIAN ASSISTANT

## 2022-01-25 PROCEDURE — 250N000013 HC RX MED GY IP 250 OP 250 PS 637: Performed by: NURSE PRACTITIONER

## 2022-01-25 PROCEDURE — 96376 TX/PRO/DX INJ SAME DRUG ADON: CPT

## 2022-01-25 PROCEDURE — 250N000011 HC RX IP 250 OP 636: Performed by: PHYSICIAN ASSISTANT

## 2022-01-25 PROCEDURE — 36415 COLL VENOUS BLD VENIPUNCTURE: CPT | Performed by: PHYSICIAN ASSISTANT

## 2022-01-25 PROCEDURE — 250N000013 HC RX MED GY IP 250 OP 250 PS 637: Performed by: PHYSICIAN ASSISTANT

## 2022-01-25 PROCEDURE — 258N000003 HC RX IP 258 OP 636: Performed by: FAMILY MEDICINE

## 2022-01-25 RX ORDER — OXYCODONE HYDROCHLORIDE 5 MG/1
5 TABLET ORAL EVERY 4 HOURS PRN
Status: DISCONTINUED | OUTPATIENT
Start: 2022-01-25 | End: 2022-01-25 | Stop reason: HOSPADM

## 2022-01-25 RX ORDER — OXYCODONE HYDROCHLORIDE 5 MG/1
5-10 TABLET ORAL EVERY 4 HOURS PRN
Status: DISCONTINUED | OUTPATIENT
Start: 2022-01-25 | End: 2022-01-25

## 2022-01-25 RX ORDER — ONDANSETRON 4 MG/1
4-8 TABLET, ORALLY DISINTEGRATING ORAL EVERY 6 HOURS PRN
Status: DISCONTINUED | OUTPATIENT
Start: 2022-01-25 | End: 2022-01-25 | Stop reason: HOSPADM

## 2022-01-25 RX ORDER — METHOCARBAMOL 500 MG/1
500 TABLET, FILM COATED ORAL 4 TIMES DAILY
Qty: 40 TABLET | Refills: 0 | Status: SHIPPED | OUTPATIENT
Start: 2022-01-25 | End: 2022-01-27

## 2022-01-25 RX ORDER — ONDANSETRON 4 MG/1
4 TABLET, ORALLY DISINTEGRATING ORAL EVERY 6 HOURS PRN
Qty: 10 TABLET | Refills: 0 | Status: SHIPPED | OUTPATIENT
Start: 2022-01-25 | End: 2022-01-27

## 2022-01-25 RX ORDER — ONDANSETRON 2 MG/ML
4-8 INJECTION INTRAMUSCULAR; INTRAVENOUS EVERY 6 HOURS PRN
Status: DISCONTINUED | OUTPATIENT
Start: 2022-01-25 | End: 2022-01-25 | Stop reason: HOSPADM

## 2022-01-25 RX ORDER — LIDOCAINE 4 G/G
1 PATCH TOPICAL EVERY 24 HOURS
Qty: 10 PATCH | Refills: 0 | Status: SHIPPED | OUTPATIENT
Start: 2022-01-25 | End: 2022-04-12

## 2022-01-25 RX ORDER — OXYCODONE HYDROCHLORIDE 5 MG/1
5 TABLET ORAL EVERY 6 HOURS PRN
Qty: 12 TABLET | Refills: 0 | Status: SHIPPED | OUTPATIENT
Start: 2022-01-25 | End: 2022-01-27

## 2022-01-25 RX ORDER — AMOXICILLIN 250 MG
1 CAPSULE ORAL 2 TIMES DAILY
Status: DISCONTINUED | OUTPATIENT
Start: 2022-01-25 | End: 2022-01-25 | Stop reason: HOSPADM

## 2022-01-25 RX ORDER — AMLODIPINE BESYLATE 5 MG/1
5 TABLET ORAL DAILY
Qty: 30 TABLET | Refills: 0 | Status: SHIPPED | OUTPATIENT
Start: 2022-01-26 | End: 2022-02-21

## 2022-01-25 RX ADMIN — HYDROMORPHONE HYDROCHLORIDE 0.5 MG: 1 INJECTION, SOLUTION INTRAMUSCULAR; INTRAVENOUS; SUBCUTANEOUS at 06:45

## 2022-01-25 RX ADMIN — METHOCARBAMOL 500 MG: 500 TABLET ORAL at 08:06

## 2022-01-25 RX ADMIN — PROCHLORPERAZINE EDISYLATE 5 MG: 5 INJECTION INTRAMUSCULAR; INTRAVENOUS at 01:27

## 2022-01-25 RX ADMIN — METHOCARBAMOL 500 MG: 500 TABLET ORAL at 12:02

## 2022-01-25 RX ADMIN — SODIUM CHLORIDE: 9 INJECTION, SOLUTION INTRAVENOUS at 08:06

## 2022-01-25 RX ADMIN — PROCHLORPERAZINE EDISYLATE 5 MG: 5 INJECTION INTRAMUSCULAR; INTRAVENOUS at 08:02

## 2022-01-25 RX ADMIN — LIDOCAINE 1 PATCH: 560 PATCH PERCUTANEOUS; TOPICAL; TRANSDERMAL at 08:06

## 2022-01-25 RX ADMIN — PROCHLORPERAZINE EDISYLATE 5 MG: 5 INJECTION INTRAMUSCULAR; INTRAVENOUS at 13:02

## 2022-01-25 RX ADMIN — BUPROPION HYDROCHLORIDE 100 MG: 100 TABLET, EXTENDED RELEASE ORAL at 08:08

## 2022-01-25 RX ADMIN — DOCUSATE SODIUM 50 MG AND SENNOSIDES 8.6 MG 1 TABLET: 8.6; 5 TABLET, FILM COATED ORAL at 08:06

## 2022-01-25 RX ADMIN — KETOROLAC TROMETHAMINE 15 MG: 30 INJECTION, SOLUTION INTRAMUSCULAR at 12:40

## 2022-01-25 RX ADMIN — AMLODIPINE BESYLATE 5 MG: 5 TABLET ORAL at 08:06

## 2022-01-25 NOTE — PLAN OF CARE
OBSERVATION GOALS: PRIOR TO DISCHARGE     - Diagnostic tests and consults completed and resulted:  Not met  - Vital signs normal or at patient baseline:  Hypertensive; stable on RA  - Tolerating oral intake to maintain hydration:  Met  - Adequate pain control on oral analgesics:  In process  - Returns to baseline functional status:  Not met  - Safe disposition plan has been identified:  Not met    Nurse to notify provider when observation goals have been met and patient is ready for discharge.

## 2022-01-25 NOTE — PLAN OF CARE
Observation goals:     -diagnostic tests and consults completed and resulted : met  -vital signs normal or at patient baseline : in progress; BP high  -tolerating oral intake to maintain hydration : in progress; intermittent nausea  -adequate pain control on oral analgesics : met  -returns to baseline functional status : not met  -safe disposition plan has been identified : not met    BP (!) 186/83 (BP Location: Right arm)   Pulse 91   Temp 98.4  F (36.9  C) (Oral)   Resp 17   Wt 81.2 kg (179 lb 0.2 oz)   SpO2 98%   BMI 30.94 kg/m

## 2022-01-25 NOTE — PROVIDER NOTIFICATION
"Let KRISTOFER know that pt admitted to taking two  \"marijuana edible gummies\" while in the hospital yesterday. No new orders or interventions.   "

## 2022-01-25 NOTE — DISCHARGE SUMMARY
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 Consider other etiologies.     Given RBCs on UA, she should follow up as an outpatient in the   urology clinic for a microscopic hematuria workup. Please place   urology referral prior to discharge.       Urology will sign off.     Discussed with Dr. Barrientos and Nena Zamarripa PA-C.    Thank you for the opportunity to participate in the care of Kimberly Laguna.     Mel Mcclelland PA-C      CBC with platelets differential     Status: None    Narrative    The following orders were created for panel order CBC with platelets differential.  Procedure                               Abnormality         Status                     ---------                               -----------         ------                     CBC with platelets and d...[[id number]]                      Final result                 Please view results for these tests on the individual orders.      Recent Results (from the past 48 hour(s))   CT Abdomen Pelvis w/o Contrast    Narrative    EXAM: CT ABDOMEN PELVIS W/O CONTRAST  LOCATION: [facility]  DATE/TIME: 1/23/2022 4:52 PM    INDICATION: Flank pain, kidney stone suspected  COMPARISON: 8/21/2007 and  8/23/2007 CT  TECHNIQUE: CT scan of the abdomen and pelvis was performed without IV contrast. Multiplanar reformats were obtained. Dose reduction techniques were used.  CONTRAST: None.    FINDINGS:   LOWER CHEST: Stable 4 mm right middle lobe and lingular subpleural nodules (series 5, images 14 and 31). Coronary atherosclerosis. Small sliding hiatal hernia.    HEPATOBILIARY: Normal.    PANCREAS: Normal.    SPLEEN: Normal.    ADRENAL GLANDS: Normal.    KIDNEYS/BLADDER: Mild dilation right renal pelvis and calyces to the level of the UPJ. This does not appear significantly changed. No renal or ureteral stone seen. It could represent a mild congenital UPJ obstruction.    Right renal 1 cm and 5 cm fluid density lesions compatible with cysts. No further imaging recommended.    BOWEL: Diverticulosis of the colon. No acute inflammatory change. No obstruction.     LYMPH NODES: Normal.    VASCULATURE: Aortoiliac atherosclerosis    PELVIC ORGANS: Normal.    MUSCULOSKELETAL: Extensive spinal degenerative change with loss of upper lumbar lordosis.      Impression    IMPRESSION:   1.  Mild dilation right renal pelvis and calyces. Consider congenital UPJ. If further evaluation needed. CT IVP recommended.  2.  Right renal 5 and 1 cm cysts.  3.  Coronary atherosclerosis.       XR Chest 2 Views    Narrative    EXAM: XR CHEST 2 VW 1/23/2022 11:04 PM    HISTORY: concern for pneumonia.    COMPARISON: Same day CT.    TECHNIQUE: Upright frontal and lateral views of the chest.    FINDINGS: Midline trachea. Cardiomediastinal silhouette is within  normal limits. Nodular foci in the right middle lobe also appreciated  on comparison CT. Streaky right basilar opacities. Asymmetric  elevation of the right hemidiaphragm. No appreciable pneumothorax or  pleural effusion. Unremarkable upper abdomen and osseous structures.      Impression    IMPRESSION:   1. Streaky right basilar opacities corresponding to those seen on  same-day CT, compatible  with atelectasis. No convincing acute airspace  disease.  2. Redemonstration of right middle lobe pulmonary nodules.    I have personally reviewed the examination and initial interpretation  and I agree with the findings.    ANIA RAMÍREZ DO SURYA         SYSTEM ID:  [id number]                Pending Results:   Unresulted Labs Ordered in the Past 30 Days of this Admission     No orders found from 12/24/2021 to 1/24/2022.                  Discharge Instructions and Follow-Up:     Discharge Procedure Orders   Reason for your hospital stay   Order Comments: Right flank pain     Activity   Order Comments: Your activity upon discharge: activity as tolerated     Order Specific Question Answer Comments   Is discharge order? Yes      Follow Up and recommended labs and tests   Order Comments: Follow up with your primary in 1 week     When to contact your care team   Order Comments: Return to the ED with fever, uncontrolled nausea, vomiting, unrelieved pain,  dizziness, chest pain, shortness of breath, loss of consciousness, and any new or concerning symptoms.     Discharge Instructions   Order Comments: You were admitted for right flank pain. Imaging obtained did not show any acute problems. Your case was reviewed by the urology team. They think your flank pain is unlikely related to a kidney issue. We think the pain is from musculoskeletal issue. Your symptoms improved with pain medications and muscle relaxer. Please continue all the prescribed medication to manage this pain Resume all home medication as before. Return if or call if you have any concerns.     Full Code     Order Specific Question Answer Comments   Code status determined by: Discussion with patient/ legal decision maker      Diet   Order Comments: Follow this diet upon discharge: Regular     Order Specific Question Answer Comments   Is discharge order? Yes           Attestation:  Ida Barrientos PA-C.
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
## 2022-01-25 NOTE — PLAN OF CARE
Observation goals:     -diagnostic tests and consults completed and resulted: met  -vital signs normal or at patient baseline : in progress  -tolerating oral intake to maintain hydration: not met; nauseas this AM  -adequate pain control on oral analgesics : in progress  -returns to baseline functional status : not met  -safe disposition plan has been identified : not met      BP (!) 186/83 (BP Location: Right arm)   Pulse 91   Temp 98.4  F (36.9  C) (Oral)   Resp 17   Wt 81.2 kg (179 lb 0.2 oz)   SpO2 98%   BMI 30.94 kg/m

## 2022-01-25 NOTE — PROGRESS NOTES
"Emergency Medicine Observation Attending note    The patient was independently seen and examined by me. The chart, vital signs, and labs were reviewed. The patient's findings were discussed with the KRISTOFER on the observation unit, and I agree with the findings of the note and the plan.    70 yo female, admitted to ED OBS after presenting to the ER with complaint of crampy right sided flank pain x 3-4 days. She has had N/V with this as well. She also noted increased urinary frequency. No recent trauma.  No fevers, hematuria. CT was done which showed, \"Mild dilation right renal pelvis and calyces. Consider congenital UPJ. If further evaluation needed. CT IVP recommended.\" No other clear cause for her sx identified by labs/exam in the ED. She was quite uncomfortable while in the ED, so was admitted for pain control and urology consult. This morning she states that the pain is better today than yesterday, but still isn't great. Some nausea but no vomiting.     BP (!) 186/83 (BP Location: Right arm)   Pulse 91   Temp 98.4  F (36.9  C) (Oral)   Resp 17   Wt 81.2 kg (179 lb 0.2 oz)   SpO2 98%   BMI 30.94 kg/m        Exam:  General: awake, alert, NAD  HEENT: NC/AT  Neck: supple  Lungs: CTA-B  Heart: RRR, no M/R/G  Abd: soft, ND/NT  Ext: non-tender, no edema  Back: right low back tenderness with palpation, CMS is intact distally with nl/equal strength and sensation throughout      Assessment/plan:  1. Flank pain - treating symptomatically with robaxin, oxycodone, dilaudid prn. Urology saw pt and do not feel that CT finding is contributing to her pain - though do recommend outpt f/u. Suspect muscular back pain. No evidence for infection.  2. N/V - ? Pain vs med related? No c/o vomiting this am. Antiemetics here.   3. High BPs - pain may be contributing. Amlodipine started yesterday.   "

## 2022-01-26 ENCOUNTER — PATIENT OUTREACH (OUTPATIENT)
Dept: CARE COORDINATION | Facility: CLINIC | Age: 72
End: 2022-01-26
Payer: MEDICARE
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
## 2022-01-27 ENCOUNTER — OFFICE VISIT (OUTPATIENT)
Dept: FAMILY MEDICINE | Facility: CLINIC | Age: 72
End: 2022-01-27
Payer: MEDICARE

## 2022-01-27 ENCOUNTER — OFFICE VISIT (OUTPATIENT)
Dept: PHARMACY | Facility: CLINIC | Age: 72
End: 2022-01-27
Payer: MEDICARE

## 2022-01-27 VITALS
OXYGEN SATURATION: 96 % | DIASTOLIC BLOOD PRESSURE: 80 MMHG | RESPIRATION RATE: 20 BRPM | SYSTOLIC BLOOD PRESSURE: 146 MMHG | HEART RATE: 100 BPM | TEMPERATURE: 98 F

## 2022-01-27 DIAGNOSIS — G57.00 PIRIFORMIS SYNDROME, UNSPECIFIED LATERALITY: ICD-10-CM

## 2022-01-27 DIAGNOSIS — R10.9 RIGHT FLANK PAIN: ICD-10-CM

## 2022-01-27 DIAGNOSIS — M62.830 BACK MUSCLE SPASM: ICD-10-CM

## 2022-01-27 DIAGNOSIS — R11.0 NAUSEA: Primary | ICD-10-CM

## 2022-01-27 DIAGNOSIS — E78.5 HYPERLIPIDEMIA, UNSPECIFIED HYPERLIPIDEMIA TYPE: ICD-10-CM

## 2022-01-27 DIAGNOSIS — M79.18 MYALGIA, OTHER SITE: ICD-10-CM

## 2022-01-27 DIAGNOSIS — R73.9 HYPERGLYCEMIA: ICD-10-CM

## 2022-01-27 DIAGNOSIS — E78.2 MIXED HYPERLIPIDEMIA: ICD-10-CM

## 2022-01-27 DIAGNOSIS — I10 BENIGN ESSENTIAL HYPERTENSION: ICD-10-CM

## 2022-01-27 DIAGNOSIS — F41.9 ANXIETY: ICD-10-CM

## 2022-01-27 DIAGNOSIS — L30.8 SPONGIOTIC PSORIASIFORM DERMATITIS: ICD-10-CM

## 2022-01-27 DIAGNOSIS — R10.9 RIGHT FLANK PAIN: Primary | ICD-10-CM

## 2022-01-27 LAB
CHOLEST SERPL-MCNC: 358 MG/DL
HBA1C MFR BLD: 5.7 % (ref 0–5.6)
HDLC SERPL-MCNC: 39 MG/DL
LDLC SERPL CALC-MCNC: 299 MG/DL
NONHDLC SERPL-MCNC: 319 MG/DL
TRIGL SERPL-MCNC: 102 MG/DL

## 2022-01-27 PROCEDURE — 99214 OFFICE O/P EST MOD 30 MIN: CPT | Mod: 25 | Performed by: FAMILY MEDICINE

## 2022-01-27 PROCEDURE — 99607 MTMS BY PHARM ADDL 15 MIN: CPT | Performed by: PHARMACIST

## 2022-01-27 PROCEDURE — 20552 NJX 1/MLT TRIGGER POINT 1/2: CPT | Performed by: FAMILY MEDICINE

## 2022-01-27 PROCEDURE — 99605 MTMS BY PHARM NP 15 MIN: CPT | Performed by: PHARMACIST

## 2022-01-27 RX ORDER — CAPSAICIN 0.025 %
1 CREAM (GRAM) TOPICAL 3 TIMES DAILY
Qty: 45 G | Refills: 3 | Status: SHIPPED | OUTPATIENT
Start: 2022-01-27

## 2022-01-27 RX ORDER — METHOCARBAMOL 500 MG/1
500 TABLET, FILM COATED ORAL 4 TIMES DAILY
Qty: 40 TABLET | Refills: 0 | Status: SHIPPED | OUTPATIENT
Start: 2022-01-27 | End: 2022-01-27

## 2022-01-27 RX ORDER — SENNA AND DOCUSATE SODIUM 50; 8.6 MG/1; MG/1
1 TABLET, FILM COATED ORAL AT BEDTIME
Qty: 100 TABLET | Refills: 1 | Status: SHIPPED | OUTPATIENT
Start: 2022-01-27 | End: 2022-04-12

## 2022-01-27 RX ORDER — ONDANSETRON 4 MG/1
4 TABLET, ORALLY DISINTEGRATING ORAL EVERY 6 HOURS PRN
Qty: 20 TABLET | Refills: 1 | Status: SHIPPED | OUTPATIENT
Start: 2022-01-27 | End: 2022-02-16

## 2022-01-27 RX ORDER — TIZANIDINE HYDROCHLORIDE 4 MG/1
4 CAPSULE, GELATIN COATED ORAL 3 TIMES DAILY PRN
Qty: 40 CAPSULE | Refills: 1 | Status: SHIPPED | OUTPATIENT
Start: 2022-01-27 | End: 2022-04-12

## 2022-01-27 RX ORDER — OXYCODONE HYDROCHLORIDE 5 MG/1
5 TABLET ORAL EVERY 6 HOURS PRN
Qty: 12 TABLET | Refills: 0 | Status: SHIPPED | OUTPATIENT
Start: 2022-01-27 | End: 2022-04-12

## 2022-01-27 ASSESSMENT — PAIN SCALES - GENERAL: PAINLEVEL: WORST PAIN (10)

## 2022-01-27 NOTE — PROGRESS NOTES
[facility] Progress Note          Assessment & Plan     Nausea  Will refill this as she reports this is quite helpful especially as she is using the oxycodone.  - ondansetron (ZOFRAN-ODT) 4 MG ODT tab; Take 1 tablet (4 mg) by mouth every 6 hours as needed for nausea or vomiting    Right flank pain  Demonstrated exercises with patient including cat and calamine while stretching reiterated the importance of keeping moving and avoiding standing still also will use medications as below.  And ice twice daily.  - oxyCODONE (ROXICODONE) 5 MG tablet; Take 1 tablet (5 mg) by mouth every 6 hours as needed for moderate to severe pain  - capsaicin (ZOSTRIX) 0.025 % external cream; Apply 1 g topically 3 times daily Wash hands with soap and water after applications.  - diclofenac (VOLTAREN) 1 % topical gel; Apply 4 grams to knees or 2 grams to hands four times daily using enclosed dosing card.  - tiZANidine (ZANAFLEX) 4 MG capsule; Take 1 capsule (4 mg) by mouth 3 times daily as needed for muscle spasms    Myalgia right paraspinal muscle   Trigger point injection done to the right paraspinal muscle patient felt considerable improvement after the injection.  Mixed hyperlipidemia  Patient has a history of hyperlipidemia will recheck nonresponders needed  - Lipid panel reflex to direct LDL Non-fasting; Future    Hyperglycemia  Patient has a history of  - Hemoglobin A1c; Future      I spent a total of 33 minutes on the day of the visit.   Time spent doing chart review, history and exam, documentation and further activities per the note           No follow-ups on file.    Corey Brooks MD  [facility] AVIVA Harris is a 71 year old who presents for the following health issues   No chief complaint on file.        HPI     Post Discharge Outreach 1/26/2022   Admission Date 1/23/2022   Discharge Date 1/25/2022   How are you doing now that you are home? much better   How are your symptoms? (Red Flag  symptoms escalate to triage hotline per guidelines) Improved   Do you feel your condition is stable enough to be safe at home until your provider visit? Yes   Does the patient have their discharge instructions?  Yes   Does the patient have questions regarding their discharge instructions?  No   Were you started on any new medications or were there changes to any of your previous medications?  Yes   Does the patient have all of their medications? Yes   Do you have questions regarding any of your medications?  No   Do you have all of your needed medical supplies or equipment (DME)?  (i.e. oxygen tank, CPAP, cane, etc.) Yes   Discharge follow-up appointment scheduled within 14 calendar days?  Yes   Discharge Follow Up Appointment Date 1/27/2022   Discharge Follow Up Appointment Scheduled with? Primary Care Provider     Hospital Follow-up Visit:    Hospital/Nursing Home/ Rehab Facility:[facility]  Date of Admission: 1/23/2022  Date of Discharge: 1/23  Reason(s) for Admission: Flank plane      Was your hospitalization related to COVID-19? No   Problems taking medications regularly:  Oxycodone  Medication changes since discharge: added oxycodone  Problems adhering to non-medication therapy:  None    Summary of hospitalization:  [facility] discharge summary reviewed  Diagnostic Tests/Treatments reviewed.  Follow up needed: none  Other Healthcare Providers Involved in Patient s Care:         None  Update since discharge: improved.       Post Discharge Medication Reconciliation: discharge medications reconciled, continue medications without change.  Plan of care communicated with patient            Had an injury during the summer-then got better -last week recurred and has significant Right sided Flank pain. Seen in ED with abdominal CT and was normal.  Patient reports that oxycodone she was prescribed has been very helpful but she continues to have  significant right-sided back pain and muscle spasm.  With notable very tight paraspinal muscles lumbar and lower thoracic area.  Otherwise she is feeling okay.           Objective    BP (!) 146/80   Pulse 100   Temp 98  F (36.7  C)   Resp 20   SpO2 96%   There is no height or weight on file to calculate BMI.  Physical Exam  Constitutional:       Appearance: She is well-developed. She is not diaphoretic.   HENT:      Head: Normocephalic.   Eyes:      Conjunctiva/sclera: Conjunctivae normal.      Pupils: Pupils are equal, round, and reactive to light.   Cardiovascular:      Rate and Rhythm: Normal rate.      Heart sounds: Normal heart sounds.   Pulmonary:      Effort: Pulmonary effort is normal.      Breath sounds: Normal breath sounds.   Abdominal:      Tenderness: There is no abdominal tenderness.   Musculoskeletal:      Cervical back: Normal range of motion. No swelling or bony tenderness.      Thoracic back: No swelling, edema or bony tenderness.      Lumbar back: Spasms ( Especially on the right side) and tenderness present. Decreased range of motion.        Back:    Lymphadenopathy:      Cervical: No cervical adenopathy.   Skin:     General: Skin is warm.   Neurological:      Mental Status: She is alert and oriented to person, place, and time.      Coordination: Coordination normal.      Deep Tendon Reflexes: Reflexes are normal and symmetric. Reflexes normal.   Psychiatric:         Behavior: Behavior normal.         Thought Content: Thought content normal.         Judgment: Judgment normal.      [facility] Trigger Point Injection Note    Kimberly Laguna here forTrigger point injection to treat trigger point pain  Consent: Verbal consent was obtained and risks (worsened pain infection, allergy and benefits were reviewed}    Preoperative Diagnosis: Trigger point  Postoperative Diagnosis: same    Technique:   5 mL of 1% lidocaine without epinephrine was injected into the body of the following right  paraspinal muscle(s) as noted in the PE section  Skin prep Alcohol wipes   Anesthesia none  EBL:   none  Complications:  No  Tolerance:  Pt tolerated procedure well and was in stable condition.       Follow up: Pt was instructed to call if bleeding, severe pain    Provider: Corey Brooks MD present for and supervised this entire procedure.            [email or web address]  [phone]

## 2022-01-27 NOTE — NURSING NOTE
"Rapid Response was called on this patient in clinic    Situation: Patient presented to  for appointment and staff were concerned because patient was bent over with head on the counter. Patient complaining of severe pain and difficulty standing so RR was called. Patient was helped to a wheelchair and brought back to exam room.    Background: Patient with hx of pleurisy, hep C, HTN, anxiety, piriformis syndrome-presenting to clinic for Hospital follow up with Dr. Brooks and Pharm D team. Patient recently hospitalized 1/23-1/25 for flank pain ultimately thought to be musculoskeletal.     Assessment: Patient A&Ox4 rates pain as severe- \"only on my right side\". VS WNL other than initial elevated BP of 189/99 which had gone down to 146/80 upon recheck.  States changing positions will suddenly cause \"severe muscle spasm\". Reports oxycodone was \"helping a little\". States it feels jared when she is bent over which is why she did that at the . Denied N/V, SOB, dizziness, vision changes, new numbness/tingling.     Recommendation: Patient deemed stable and was able to have appointment with Pharm D team and then with Dr. Brooks. See their notes for details.      Melva Diaz RN      "

## 2022-01-27 NOTE — Clinical Note
Sorry we weren't able to connect before you saw Kimberly. Changes you made to her pain regimen seem reasonable.   We talked about adherence quite a bit (i.e. celebrex, wellbutrin, and atorvastatin)  She also seems to be experiencing some OIC, so we started Senna-S.   Thanks for the collaboration!  Ashlee

## 2022-01-27 NOTE — PROGRESS NOTES
Medication Therapy Management (MTM) Encounter    ASSESSMENT:                            Medication Adherence/Access: See below for considerations    Right flank pain: Uncontrolled  Kimberly is still experiencing significant pain impacting her quality of life.  Patient would benefit from continuing medications for acute pain. Another short course of oxycodone, ondansetron, and a muscle relaxant would be reasonable. Alternative topical agents may provide relief as well.   Educated Kimberly on increase risk of bleed with high dose (400mg daily) celecoxib and encouraged her to take BID as prescribed.   Kimberly would also benefit from a laxative/stool softener. Senna-S would be preferred over bisacodyl due to tolerability concerns (Senna-S less likely to cause cramping)    Piriformis syndrome: Controlled  No recommended changes.     Hyperlipidemia: Uncontrolled - adherence  PharmD provided education regarding cardiovascular benefits of atorvastatin. Encouraged Kimberly to begin taking regularly for maximum benefit.     Bipolar/PTSD/anxiety: Uncontrolled - adherence  Mood has been stable, but PharmD encouraged Kimberly to take bupropion as prescribed consistently to have maximum benefit.     Hypertension: Uncontrolled  Patient is not meeting BP goal of < 130/80mmHg.  Reviewed medications and side effects in detail.  Blood pressure readings today likely reflect current acute pain episode. Suspect blood pressure may still be elevated regardless of acute pain, but further monitoring would be recommended before increasing amlodipine dose.     Dermatitis: Controlled  No recommended changes    PLAN:                            1.  Start Senna -S one tablet at bedtime for opioid induced constipation  2. Refill short course of oxycodone at PCP discretion  3. Continue muscle relaxant (tizanidine replaces methocarbamol) as needed  4. Apply capsaicin or diclofenac gel to affected area as needed  5. Take celecoxib, atorvastatin, and bupropion as  prescribed.  6. Continue all other medications    Follow-up: with PCP     SUBJECTIVE/OBJECTIVE:                          Kimberly Laguna is a 71 year old female coming in for a TCM visit.  She was discharged from [facility] on 1/25/21 for right flank pain. Patient is seeing provider after our visit today.     Reason for visit: TCM.    Allergies/ADRs: Reviewed in chart  Past Medical History: Reviewed in chart  Tobacco: She reports that she has never smoked. She has never used smokeless tobacco.  Other Substance Use: Regular marijuana use for years    Medication Adherence/Access: Kimberly seems to take medications based on how she is feeling and her beliefs about what medications do    Right Flank pain:     Oxycodone 5mg every 6h prn    Methocarbamol 500mg QID    Lidocaine 4% patch apply once daily for 12 hours    Ondansetron ODT 4mg every 6 hours prn nausea/vomiting    Kimberly is still experiencing pain. She finds it difficult to move, as well as lay down. She reports the pain is so bad that she vomits/has significant nausea. She is currently taking oxy, methocarbamol, ondansetron, and celecoxib QID. Because of her understanding of how celecoxib works to decrease inflammation, she had increased it to QID on her own to better manage her acute pain.     Kimberly mentioned she understands NSAIDs are hard on the stomach, so she always drinks plenty of water throughout the day.     She has had one small hard bowel movement since discharge a few days ago. She is not currently taking laxative or stool softener. Has bisacodyl at home.    Requesting refills on oxycodone and ondansetron today.     Piriformis syndrome:     celebrex 100mg BID     As stated above, Kimberly has increased celecoxib to QID due to acute pain.     Hyperlipidemia:     Atorvastatin 40mg daily     Kimberly has been more motivated to continue atorvastatin therapy as she heard someone talk about its protective effects on the heart. Was previously not taking atorvastatin  "regularly.    Recent Labs   Lab Test 01/25/22  0537 01/28/20  1632 09/19/14  1355   CHOL 358* 386* 329*   HDL 39* 40* 42*   * 318* 250*   TRIG 102 141 187*   CHOLHDLRATIO  --   --  7.8*       Bipolar/PTSD/anxiety:    Wellbutrin 200 mg daily (sometimes taking 100 - 300mg daily)     Hydroxyzine 75mg at bedtime on average    1/2 tab during the day for anxiety     Kimberly reports her mood has been stable. She is unsure of her mental health diagnosis. She takes bupropion irregularly - taking 100mg to 300mg daily. She starts with one tablet in the AM, then takes the second one if she remembers/thinks she needs it. On \"bad days\" she will take a third tablet.     Hypertension:     Amlodipine 5mg daily  Pt reports taking medications as instructed, no medication side effects noted, no swelling of ankles.  Amlodipine started at discharge, so Kimberly has only had 2 doses.   Self-monitoring: blood pressure cuff at home, but has not been using. Kimberly states she previously was not measuring her blood pressure before because it was high and made her even more anxious. Reflects that she should start measuring her BP at home again.     BP Readings from Last 3 Encounters:   01/27/22 (!) 146/80   01/25/22 (!) 186/83   11/18/21 (!) 153/80         Dermatitis:    Triamcinolone 0.5% cream apply to affected area TID    Kimberly uses sparingly as needed. Reports therapy as effective.    Today's Vitals: There were no vitals taken for this visit.  ----------------  Post Discharge Medication Reconciliation Status: discharge medications reconciled and changed, per note/orders.    I spent 20 minutes with this patient today (out of pocket cost for future MTM visits was discussed). All changes were made via collaborative practice agreement with Dr Giovanni Brooks. A copy of the visit note was provided to the patient's provider(s).    The patient was given a summary of these recommendations. See Provider note/AVS from today.     Ashlee Carvalho, PharmD, " BCACP           Medication Therapy Recommendations  Hyperlipidemia, unspecified hyperlipidemia type    Current Medication: atorvastatin (LIPITOR) 40 MG tablet   Rationale: Patient prefers not to take - Adherence - Adherence   Recommendation: Provide Education   Status: Accepted - no CPA Needed         Piriformis syndrome, unspecified laterality    Current Medication: celecoxib (CELEBREX) 100 MG capsule   Rationale: Does not understand instructions - Adherence - Adherence   Recommendation: Provide Education - Additionally true for bupropion as well   Status: Accepted - no CPA Needed         Right flank pain    Current Medication: oxyCODONE (ROXICODONE) 5 MG tablet   Rationale: Undesirable effect - Adverse medication event - Safety   Recommendation: Start Medication - Experiencing opioid induced constipation - start Senna-S   Status: Accepted per CPA

## 2022-02-05 ENCOUNTER — TELEPHONE (OUTPATIENT)
Dept: FAMILY MEDICINE | Facility: CLINIC | Age: 72
End: 2022-02-05
Payer: MEDICARE

## 2022-02-05 NOTE — TELEPHONE ENCOUNTER
Message Return  2/5/2022  10:10 AM    Message returned by Jermaine Robledo MD    Patient: Kimberly Laguna   Phone number-  [phone] (home)       return their call  Phone conversation with: Patient    Situation: Kimberly Laguan  Is a 71 year old  female who is calling because of  excrutiating abdominal pain.  Background : Patient on oxycodone for chronic abd pain. Patient calling today as pain is uncontrolled and the only thing she can think about is how to kill her self to make the pain go away.   Assessment: 71F on chronic pain medication with SI due to excruciating chronic pain.   Recommendation/Plan: Advised caller to call 911 and go to the emergency department-preferably [facility]/[place]

## 2022-02-16 DIAGNOSIS — R11.0 NAUSEA: ICD-10-CM

## 2022-02-16 RX ORDER — ONDANSETRON 4 MG/1
4 TABLET, ORALLY DISINTEGRATING ORAL EVERY 6 HOURS PRN
Qty: 20 TABLET | Refills: 1 | Status: SHIPPED | OUTPATIENT
Start: 2022-02-16 | End: 2022-04-12

## 2022-02-21 ENCOUNTER — OFFICE VISIT (OUTPATIENT)
Dept: FAMILY MEDICINE | Facility: CLINIC | Age: 72
End: 2022-02-21
Payer: MEDICARE

## 2022-02-21 VITALS
SYSTOLIC BLOOD PRESSURE: 138 MMHG | BODY MASS INDEX: 29.02 KG/M2 | DIASTOLIC BLOOD PRESSURE: 85 MMHG | WEIGHT: 170 LBS | HEART RATE: 92 BPM | TEMPERATURE: 98.2 F | HEIGHT: 64 IN | RESPIRATION RATE: 16 BRPM | OXYGEN SATURATION: 96 %

## 2022-02-21 DIAGNOSIS — R10.9 RIGHT FLANK PAIN: ICD-10-CM

## 2022-02-21 DIAGNOSIS — I10 BENIGN ESSENTIAL HYPERTENSION: Primary | ICD-10-CM

## 2022-02-21 DIAGNOSIS — E78.2 MIXED HYPERLIPIDEMIA: ICD-10-CM

## 2022-02-21 PROCEDURE — 99214 OFFICE O/P EST MOD 30 MIN: CPT | Performed by: FAMILY MEDICINE

## 2022-02-21 RX ORDER — AMLODIPINE BESYLATE 5 MG/1
5 TABLET ORAL DAILY
Qty: 90 TABLET | Refills: 3 | Status: SHIPPED | OUTPATIENT
Start: 2022-02-21 | End: 2022-04-12

## 2022-02-21 RX ORDER — ATORVASTATIN CALCIUM 40 MG/1
40 TABLET, FILM COATED ORAL DAILY
Qty: 90 TABLET | Refills: 3 | Status: SHIPPED | OUTPATIENT
Start: 2022-02-21 | End: 2022-04-12

## 2022-02-21 NOTE — PATIENT INSTRUCTIONS
Here is the plan from today's visit    1. Right flank pain  Continue exercises and stretching.  May use celecoxib as you have capsaicin and Voltaren gel.    2. Mixed hyperlipidemia  Restart this and recommend taking it thank you should help reduce your lipids.  - atorvastatin (LIPITOR) 40 MG tablet; Take 1 tablet (40 mg) by mouth daily  Dispense: 90 tablet; Refill: 3    3. Benign essential hypertension  Continue the amlodipine  - amLODIPine (NORVASC) 5 MG tablet; Take 1 tablet (5 mg) by mouth daily  Dispense: 90 tablet; Refill: 3      Please call or return to clinic if your symptoms don't go away.    Follow up plan  Return in about 3 months (around 5/21/2022).     Thank you for coming to [facility] today.  Lab Testing:  **If you had lab testing today and your results are reassuring or normal they will be mailed to you or sent through [employer] within 7 days.   **If the lab tests need quick action we will call you with the results.  The phone number we will call with results is # [phone] (home) . If this is not the best number please call our clinic and change the number.  Medication Refills:  If you need any refills please call your pharmacy and they will contact us.   If you need to  your refill at a new pharmacy, please contact the new pharmacy directly. The new pharmacy will help you get your medications transferred faster.   Scheduling:  If you have any concerns about today's visit or wish to schedule another appointment please call our office during normal business hours [phone] (8-5:00 M-F)   eferrals to [facility] please call [phone].   Mammogram Scheduling [phone]     XRay/CT/Ultrasound/MRI Scheduling [phone]    Medical Concerns:  If you have urgent medical concerns please call [phone] at any time of the day.    Corey Brooks MD

## 2022-02-21 NOTE — PROGRESS NOTES
[facility] Progress Note          Assessment & Plan     Right flank pain  Right flank pain was assessed and last visit was mixed mostly related to muscle spasm related to her injury though patient requested opiates for intermittent pain I declined and felt that it would not be a helpful or safe option.    Mixed hyperlipidemia  Patient reports that she has not previously been taking atorvastatin though she is open to taking it because it may help decrease heart attacks and strokes if she will start that.  - atorvastatin (LIPITOR) 40 MG tablet; Take 1 tablet (40 mg) by mouth daily    Benign essential hypertension  Patient's blood pressure was initially elevated though on recheck was in the normal range.  I encouraged patient to continue taking her amlodipine.  - amLODIPine (NORVASC) 5 MG tablet; Take 1 tablet (5 mg) by mouth daily      I spent a total of 32 minutes on the day of the visit.   Time spent doing chart review, history and exam, documentation and further activities per the note           No follow-ups on file.    Corey Brooks MD  [facility] AVIVA Harris is a 71 year old who presents for the following health issues   Patient presents with:  Referral: pt here to discuss for pain managment and medical marijuana  Pain: pt here to discuss ongoing right side abdominal pain and flank pain that comes and goes. pain has improved over the last week. pain is also present in left side of neck, capsacin cream has helped.   Medication Question: ptt has questions about certain medications causing GI distress for her   Refill Request: amlodipine     [email or web address]  [phone]      HPI   Patient presents for medical cannabis visit.  On review of her records it was discovered that she is already certified and that she needs to complete the application.  She also presents for follow-up of her amlodipine.  And her hypertension.  Hypertension on recheck appears to be well  "controlled.  Will continue her amlodipine.  Patient did request some opioids for her chronic pain I discussed with her that her pain of muscle spasm was not appropriate for opioids and so this was not prescribed.        Review of Systems         Objective    /85   Pulse 92   Temp 98.2  F (36.8  C) (Oral)   Resp 16   Ht 1.62 m (5' 3.78\")   Wt 77.1 kg (170 lb)   SpO2 96%   BMI 29.38 kg/m    Body mass index is 29.38 kg/m .  Physical Exam  Vitals reviewed.   Constitutional:       General: She is not in acute distress.     Appearance: She is well-developed. She is not diaphoretic.   HENT:      Head: Normocephalic.   Eyes:      General: No scleral icterus.     Conjunctiva/sclera: Conjunctivae normal.   Neck:      Thyroid: No thyromegaly.   Cardiovascular:      Rate and Rhythm: Normal rate and regular rhythm.      Heart sounds: Normal heart sounds. No murmur heard.  Pulmonary:      Effort: Pulmonary effort is normal. No respiratory distress.      Breath sounds: Normal breath sounds. No wheezing.   Abdominal:      General: Bowel sounds are normal. There is no distension.      Palpations: Abdomen is soft. There is no hepatomegaly or splenomegaly.      Tenderness: There is no abdominal tenderness.   Musculoskeletal:      Cervical back: Normal range of motion.   Lymphadenopathy:      Cervical: No cervical adenopathy.   Skin:     General: Skin is warm and dry.   Neurological:      Mental Status: She is alert and oriented to person, place, and time.   Psychiatric:         Behavior: Behavior normal.         Thought Content: Thought content normal.         Judgment: Judgment normal.                        "

## 2022-04-11 NOTE — PROGRESS NOTES
"  Assessment & Plan     Patient presents for refills of medications. She has felt her mental health is stable, declines PHQ9 today. She is also due for covid vaccination booster.     Benign essential hypertension  Blood pressure is normal today. No lightheadedness, dizziness, vision changes, headaches, or other symptoms of high or low blood pressures.   - amLODIPine (NORVASC) 5 MG tablet  Dispense: 90 tablet; Refill: 3    Bipolar affective disorder in remission (H)  Patient declines PHQ9 today, but feels stable on current dose of wellbutrin  - buPROPion (WELLBUTRIN SR) 100 MG 12 hr tablet  Dispense: 180 tablet; Refill: 3    Itching  Has previously required prior authorization for this. Uses hydroxyzine for itching related to spongiotic psoriasiform dermatitis.   - hydrOXYzine (ATARAX) 25 MG tablet  Dispense: 720 tablet; Refill: 3    Spongiotic psoriasiform dermatitis  Refill of topical creams and hydroxyzine for symptoms of spongiotic psoriasiform dermatitis.   - triamcinolone (ARISTOCORT HP) 0.5 % external cream  Dispense: 90 g; Refill: 4  - hydrOXYzine (ATARAX) 25 MG tablet  Dispense: 720 tablet; Refill: 3     High priority for 2019-nCoV vaccine  Booster given today.   - COVID-19,PF,PFIZER (12+ Yrs GRAY LABEL)    BMI:   Estimated body mass index is 28.52 kg/m  as calculated from the following:    Height as of 2/21/22: 1.62 m (5' 3.78\").    Weight as of this encounter: 74.8 kg (165 lb).   Did not discuss today in detail.       No follow-ups on file.    Naye Jean-Baptiste MD  [facility] AVIVA Harris is a 71 year old who presents for the following health issues   Chief Complaint   Patient presents with     Recheck Medication     Med refills      Imm/Inj     COVID-19 VACCINE       HPI     rx ran out of refills.   Declines use of statin.     Otherwise feels her medical issues have been stable.     Review of Systems   Constitutional, HEENT, cardiovascular, pulmonary, gi and gu systems are " negative, except as otherwise noted.        Objective    /75   Pulse 82   Temp 98.5  F (36.9  C) (Oral)   Resp 16   Wt 74.8 kg (165 lb)   SpO2 97%   Breastfeeding No   BMI 28.52 kg/m    Body mass index is 28.52 kg/m .  Physical Exam  Constitutional:       Appearance: Normal appearance.   HENT:      Head: Normocephalic.   Eyes:      General: No scleral icterus.     Extraocular Movements: Extraocular movements intact.      Conjunctiva/sclera: Conjunctivae normal.   Cardiovascular:      Rate and Rhythm: Normal rate.   Pulmonary:      Effort: Pulmonary effort is normal.   Musculoskeletal:         General: Normal range of motion.      Cervical back: Normal range of motion.   Neurological:      General: No focal deficit present.      Mental Status: She is alert and oriented to person, place, and time.   Psychiatric:         Attention and Perception: Attention normal.         Behavior: Behavior normal. Behavior is cooperative.      Comments: Poor eye contact Speech occasionally tangential and rapid, but overall follows a conversational gio.         No testing ordered today    This office note has been dictated.

## 2022-04-12 ENCOUNTER — OFFICE VISIT (OUTPATIENT)
Dept: FAMILY MEDICINE | Facility: CLINIC | Age: 72
End: 2022-04-12
Payer: MEDICARE

## 2022-04-12 VITALS
OXYGEN SATURATION: 97 % | TEMPERATURE: 98.5 F | HEART RATE: 82 BPM | WEIGHT: 165 LBS | SYSTOLIC BLOOD PRESSURE: 126 MMHG | RESPIRATION RATE: 16 BRPM | BODY MASS INDEX: 28.52 KG/M2 | DIASTOLIC BLOOD PRESSURE: 75 MMHG

## 2022-04-12 DIAGNOSIS — L29.9 ITCHING: ICD-10-CM

## 2022-04-12 DIAGNOSIS — L30.8 SPONGIOTIC PSORIASIFORM DERMATITIS: ICD-10-CM

## 2022-04-12 DIAGNOSIS — I10 BENIGN ESSENTIAL HYPERTENSION: Primary | ICD-10-CM

## 2022-04-12 DIAGNOSIS — Z23 HIGH PRIORITY FOR 2019-NCOV VACCINE: ICD-10-CM

## 2022-04-12 DIAGNOSIS — F31.70 BIPOLAR AFFECTIVE DISORDER IN REMISSION (H): ICD-10-CM

## 2022-04-12 PROCEDURE — 91305 COVID-19,PF,PFIZER (12+ YRS): CPT | Performed by: STUDENT IN AN ORGANIZED HEALTH CARE EDUCATION/TRAINING PROGRAM

## 2022-04-12 PROCEDURE — 0054A COVID-19,PF,PFIZER (12+ YRS): CPT | Performed by: STUDENT IN AN ORGANIZED HEALTH CARE EDUCATION/TRAINING PROGRAM

## 2022-04-12 PROCEDURE — 99214 OFFICE O/P EST MOD 30 MIN: CPT | Mod: GC | Performed by: STUDENT IN AN ORGANIZED HEALTH CARE EDUCATION/TRAINING PROGRAM

## 2022-04-12 RX ORDER — AMLODIPINE BESYLATE 5 MG/1
5 TABLET ORAL DAILY
Qty: 90 TABLET | Refills: 3 | Status: SHIPPED | OUTPATIENT
Start: 2022-04-12 | End: 2023-05-11

## 2022-04-12 RX ORDER — HYDROXYZINE HYDROCHLORIDE 25 MG/1
50 TABLET, FILM COATED ORAL 4 TIMES DAILY
Qty: 720 TABLET | Refills: 3 | Status: SHIPPED | OUTPATIENT
Start: 2022-04-12 | End: 2023-08-01

## 2022-04-12 RX ORDER — TRIAMCINOLONE ACETONIDE 5 MG/G
CREAM TOPICAL
Qty: 90 G | Refills: 4 | Status: SHIPPED | OUTPATIENT
Start: 2022-04-12 | End: 2023-08-01

## 2022-04-12 RX ORDER — BUPROPION HYDROCHLORIDE 100 MG/1
200 TABLET, EXTENDED RELEASE ORAL DAILY
Qty: 180 TABLET | Refills: 3 | Status: SHIPPED | OUTPATIENT
Start: 2022-04-12 | End: 2023-05-11

## 2022-04-12 NOTE — PATIENT INSTRUCTIONS
Patient Education   Here is the plan from today's visit    1. High priority for 2019-nCoV vaccine  - COVID-19,PF,PFIZER (12+ Yrs GRAY LABEL)    2. Benign essential hypertension  - amLODIPine (NORVASC) 5 MG tablet; Take 1 tablet (5 mg) by mouth daily  Dispense: 90 tablet; Refill: 3    3. Bipolar affective disorder in remission (H)  - buPROPion (WELLBUTRIN SR) 100 MG 12 hr tablet; Take 2 tablets (200 mg) by mouth daily  Dispense: 180 tablet; Refill: 3    4. Itching  - hydrOXYzine (ATARAX) 25 MG tablet; Take 2 tablets (50 mg) by mouth 4 times daily Ok to refill early  Dispense: 720 tablet; Refill: 3    5. Spongiotic psoriasiform dermatitis  - triamcinolone (ARISTOCORT HP) 0.5 % external cream; Apply sparingly to affected area three times daily.  Dispense: 90 g; Refill: 4      Please call or return to clinic if your symptoms don't go away.    Follow up plan  No follow-ups on file.    Thank you for coming to [facility] today.  Lab Testing:  **If you had lab testing today and your results are reassuring or normal they will be mailed to you or sent through [employer] within 7 days.   **If the lab tests need quick action we will call you with the results.  **If you are having labs done on a different day, please call [phone] to schedule at [facility] or [phone] for other [facility] locations. Labs do not offer walk-in appointments.  The phone number we will call with results is # [phone] (home) . If this is not the best number please call our clinic and change the number.  Medication Refills:  If you need any refills please call your pharmacy and they will contact us.   If you need to  your refill at a new pharmacy, please contact the new pharmacy directly. The new pharmacy will help you get your medications transferred faster.   Scheduling:  If you have any concerns about today's visit or wish to schedule another appointment please call our office during normal business hours  [phone] (8-5:00 M-F)  If a referral was made to an [facility] specialty provider and you do not get a call from central scheduling, please refer to directions on your visit summary or call our office during normal business hours for assistance.   If a Mammogram was ordered for you at the [facility] Center call [phone] to schedule or change your appointment.  If you had an XRay/CT/Ultrasound/MRI ordered the number is [phone] to schedule or change your radiology appointment.   [facility] has limited ultrasound appointments available on Wednesdays, if you would like your ultrasound at [facility], please call [phone] to schedule.   Medical Concerns:  If you have urgent medical concerns please call [phone] at any time of the day.    Naye Jean-Baptiste MD

## 2022-04-12 NOTE — PROGRESS NOTES
Preceptor Attestation:    I discussed the patient with the resident and evaluated the patient in person. I have verified the content of the note, which accurately reflects my assessment of the patient and the plan of care.   Supervising Physician:  Renée Drummond DO.

## 2022-04-15 ENCOUNTER — TELEPHONE (OUTPATIENT)
Dept: FAMILY MEDICINE | Facility: CLINIC | Age: 72
End: 2022-04-15
Payer: MEDICARE

## 2022-04-15 NOTE — TELEPHONE ENCOUNTER
Prior Authorization Retail Medication Request    Medication/Dose: hydrOXYzine (ATARAX) 25 MG tablet  ICD code (if different than what is on RX):  See chart  Previously Tried and Failed:  N/A  Rationale:  See robert    Insurance Name: E-ARE/ARE MEDICARE SUPPLEMENT  Insurance ID: [id number]      Pharmacy Information (if different than what is on RX)  Name:  [id number] [place] [id number]  Phone:  [phone] F: [phone]

## 2022-04-19 NOTE — TELEPHONE ENCOUNTER
Patient called and said that the medication (hydrOXYzine) needs a prior authorization through [employer] ([employer] is requiring the authorization. [employer] customer service number is [phone]. Kimberly's call back number is [phone].     Deyanira Barrientos

## 2022-04-20 NOTE — TELEPHONE ENCOUNTER
Prior Authorization Approval    Authorization Effective Date: 1/1/2022  Authorization Expiration Date: 4/20/2023  Medication: hydroxyzine  Approved Dose/Quantity:   Reference #:     Insurance Company: Medicare Blue - Phone [phone] Fax [phone]  Expected CoPay:       CoPay Card Available:      Foundation Assistance Needed:    Which Pharmacy is filling the prescription (Not needed for infusion/clinic administered): [facility] [id number] [place] [address]  Pharmacy Notified: Yes  Patient Notified: No    **Instructed pharmacy to notify patient when script is ready to /ship.**

## 2022-04-20 NOTE — TELEPHONE ENCOUNTER
Central Prior Authorization Team   Phone: [phone]      PA Initiation    Medication: hydroxyzine  Insurance Company: Medicare Blue - Phone [phone] Fax [phone]  Pharm[id number] [place] [facility] [address] [place] [address] [address]  Filling Pharmacy Phone: [phone]  Filling Pharmacy Fax:    Start Date: 4/20/2022

## 2022-07-15 ENCOUNTER — TRANSFERRED RECORDS (OUTPATIENT)
Dept: FAMILY MEDICINE | Facility: CLINIC | Age: 72
End: 2022-07-15

## 2022-11-25 NOTE — TELEPHONE ENCOUNTER
Hydroxyzine request from [facility].  Last refill sent 4/12/22. #720 with 3 additional refills (a 12 month supply).   Too soon to fill, faxed denial with info to pharmacy.  Nuria Ewing RN

## 2023-04-14 ENCOUNTER — TELEPHONE (OUTPATIENT)
Dept: FAMILY MEDICINE | Facility: CLINIC | Age: 73
End: 2023-04-14

## 2023-04-14 NOTE — TELEPHONE ENCOUNTER
Reason for call: Schedule appointment     Attempt to reach: 1st    Outcome:Left voicemail    Detailed message left? Yes     LVM to schedule an annual physical and Pharm D appt back to back per pharmacist.     Please return call to [facility]     Clinic phone number [phone]

## 2023-05-10 DIAGNOSIS — F31.70 BIPOLAR AFFECTIVE DISORDER IN REMISSION (H): ICD-10-CM

## 2023-05-10 DIAGNOSIS — I10 BENIGN ESSENTIAL HYPERTENSION: ICD-10-CM

## 2023-05-10 NOTE — LETTER
5/10/2023         RE: Kimberly Laguna  [address]  [place] [address]      Good Morning,    We have received refill requests for a few of your medications. They have been forwarded to your provider.    You were last seen in clinic on 4/12/2022.    In order to provide further refills we will need you to schedule a visit so that we can check in with you to see how you are doing.    Please call [phone] to schedule an appointment    Thank you    Monika Tran RN

## 2023-05-10 NOTE — LETTER
May 11, 2023      Kimberly  [address]  [place] [address]    [phone]      Dear Kimberly,      A request for a refill on your Amlodipine bupropion and  prescription was sent to us from your pharmacy. I have notified the pharmacy to allow you one more refill.     It is time for your recheck at the clinic so we can monitor the medication and continue to prescribe it safely. Please make clinic appointment with me or another provider at [facility] in the next one month. This visit could be virtual or in-person.    Thank you for choosing us as your healthcare provider.      Sincerely,        Corey Brooks MD

## 2023-05-11 RX ORDER — BUPROPION HYDROCHLORIDE 100 MG/1
TABLET, EXTENDED RELEASE ORAL
Qty: 180 TABLET | Refills: 0 | Status: SHIPPED | OUTPATIENT
Start: 2023-05-11 | End: 2023-08-01

## 2023-05-11 RX ORDER — AMLODIPINE BESYLATE 5 MG/1
TABLET ORAL
Qty: 90 TABLET | Refills: 0 | Status: SHIPPED | OUTPATIENT
Start: 2023-05-11 | End: 2023-08-01

## 2023-05-11 NOTE — TELEPHONE ENCOUNTER
"Last seen 4/12/22, message was left for patient to schedule an appointment on 4/14/23. No visit is scheduled, I am sending a letter.    Request for medication refill:  amLODIPine (NORVASC) 5 MG tablet  buPROPion (WELLBUTRIN SR) 100 MG 12 hr tablet  Providers if patient needs an appointment and you are willing to give a one month supply please refill for one month and  send a letter/MyChart using \".SMILLIMITEDREFILL\" .smillimited and route chart to \"P SMI \" (Giving one month refill in non controlled medications is strongly recommended before denial)    If refill has been denied, meaning absolutely no refills without visit, please complete the smart phrase \".smirxrefuse\" and route it to the \"P SMI MED REFILLS\"  pool to inform the patient and the pharmacy.    Monika Tran RN        "

## 2023-08-01 ENCOUNTER — OFFICE VISIT (OUTPATIENT)
Dept: FAMILY MEDICINE | Facility: CLINIC | Age: 73
End: 2023-08-01
Payer: MEDICARE

## 2023-08-01 VITALS
HEART RATE: 89 BPM | BODY MASS INDEX: 29.96 KG/M2 | OXYGEN SATURATION: 96 % | RESPIRATION RATE: 18 BRPM | WEIGHT: 169.1 LBS | SYSTOLIC BLOOD PRESSURE: 123 MMHG | DIASTOLIC BLOOD PRESSURE: 79 MMHG | HEIGHT: 63 IN

## 2023-08-01 DIAGNOSIS — Z12.31 VISIT FOR SCREENING MAMMOGRAM: ICD-10-CM

## 2023-08-01 DIAGNOSIS — I10 BENIGN ESSENTIAL HYPERTENSION: ICD-10-CM

## 2023-08-01 DIAGNOSIS — R73.9 HYPERGLYCEMIA: ICD-10-CM

## 2023-08-01 DIAGNOSIS — L30.8 SPONGIOTIC PSORIASIFORM DERMATITIS: ICD-10-CM

## 2023-08-01 DIAGNOSIS — Z12.11 SCREEN FOR COLON CANCER: Primary | ICD-10-CM

## 2023-08-01 DIAGNOSIS — L29.9 ITCHING: ICD-10-CM

## 2023-08-01 DIAGNOSIS — Z23 NEED FOR DIPHTHERIA-TETANUS-PERTUSSIS (TDAP) VACCINE: ICD-10-CM

## 2023-08-01 DIAGNOSIS — Z00.00 ENCOUNTER FOR MEDICARE ANNUAL WELLNESS EXAM: ICD-10-CM

## 2023-08-01 DIAGNOSIS — F31.70 BIPOLAR AFFECTIVE DISORDER IN REMISSION (H): ICD-10-CM

## 2023-08-01 DIAGNOSIS — Z23 NEED FOR PROPHYLACTIC VACCINATION WITH COMBINED DIPHTHERIA-TETANUS-PERTUSSIS (DTP) VACCINE: ICD-10-CM

## 2023-08-01 LAB — HBA1C MFR BLD: 5.8 % (ref 0–5.6)

## 2023-08-01 PROCEDURE — 83036 HEMOGLOBIN GLYCOSYLATED A1C: CPT | Performed by: FAMILY MEDICINE

## 2023-08-01 PROCEDURE — 87522 HEPATITIS C REVRS TRNSCRPJ: CPT | Performed by: FAMILY MEDICINE

## 2023-08-01 PROCEDURE — 90677 PCV20 VACCINE IM: CPT | Performed by: FAMILY MEDICINE

## 2023-08-01 PROCEDURE — G0009 ADMIN PNEUMOCOCCAL VACCINE: HCPCS | Performed by: FAMILY MEDICINE

## 2023-08-01 PROCEDURE — G0439 PPPS, SUBSEQ VISIT: HCPCS | Performed by: FAMILY MEDICINE

## 2023-08-01 PROCEDURE — 36415 COLL VENOUS BLD VENIPUNCTURE: CPT | Performed by: FAMILY MEDICINE

## 2023-08-01 RX ORDER — BUPROPION HYDROCHLORIDE 100 MG/1
200 TABLET, EXTENDED RELEASE ORAL DAILY
Qty: 180 TABLET | Refills: 3 | Status: SHIPPED | OUTPATIENT
Start: 2023-08-01 | End: 2024-08-02

## 2023-08-01 RX ORDER — TRIAMCINOLONE ACETONIDE 5 MG/G
CREAM TOPICAL
Qty: 90 G | Refills: 4 | Status: SHIPPED | OUTPATIENT
Start: 2023-08-01

## 2023-08-01 RX ORDER — HYDROXYZINE HYDROCHLORIDE 25 MG/1
50 TABLET, FILM COATED ORAL 3 TIMES DAILY
Qty: 90 TABLET | Refills: 1 | Status: SHIPPED | OUTPATIENT
Start: 2023-08-01 | End: 2023-11-24

## 2023-08-01 RX ORDER — AMLODIPINE BESYLATE 5 MG/1
5 TABLET ORAL DAILY
Qty: 90 TABLET | Refills: 3 | Status: SHIPPED | OUTPATIENT
Start: 2023-08-01 | End: 2024-04-29

## 2023-08-01 ASSESSMENT — ENCOUNTER SYMPTOMS
FEVER: 0
HEARTBURN: 0
FREQUENCY: 0
DIZZINESS: 1
PARESTHESIAS: 0
HEADACHES: 1
SORE THROAT: 0
DIARRHEA: 0
DYSURIA: 0
MYALGIAS: 0
NAUSEA: 0
CHILLS: 0
EYE PAIN: 0
WEAKNESS: 1
PALPITATIONS: 0
SHORTNESS OF BREATH: 1
ARTHRALGIAS: 1
HEMATURIA: 0
BREAST MASS: 0
CONSTIPATION: 0
COUGH: 0
NERVOUS/ANXIOUS: 0
ABDOMINAL PAIN: 0
JOINT SWELLING: 0
HEMATOCHEZIA: 0

## 2023-08-01 ASSESSMENT — ACTIVITIES OF DAILY LIVING (ADL): CURRENT_FUNCTION: NO ASSISTANCE NEEDED

## 2023-08-01 NOTE — LETTER
August 3, 2023      Kimberly Laguna  [address]  [facility] [address]        Dear Kimberly,    Thank you for getting your care at [facility]. Please see below for your test results. The Hep C is negative! Great News!!  You A1 c is 5.8 % -considered to be prediabetes.    Resulted Orders   Hepatitis C RNA, Quantitative by PCR with Confirmatory Reflex to Genotyping   Result Value Ref Range    Hepatitis C RNA IU/mL Not Detected Not Detected IU/mL    Narrative    The krish  Hepatitis C assay is an FDA-approved in vitro nucleic acid amplification test for the quantification of Hepatitis C virus (HCV) RNA in human EDTA plasma or serum, using the Roche krish  6800 instrument for automated viral nucleic acid extraction, purification, amplification, and detection of the viral nucleic acid target. This assay utilizes dual probes to detect and quantify, but not discriminate genotypes 1-6. The test is intended for use as an aid in the diagnosis of HCV infection and an aid in the management of HCV-infected patients undergoing anti-viral therapy. Titer results are reported in IU/mL.   Hemoglobin A1c   Result Value Ref Range    Hemoglobin A1C 5.8 (H) 0.0 - 5.6 %      Comment:      Normal <5.7%   Prediabetes 5.7-6.4%    Diabetes 6.5% or higher     Note: Adopted from ADA consensus guidelines.       If you have any concerns about these results please call and leave a message for me or send a MyChart message to the clinic.    Sincerely,    Corey Brooks MD

## 2023-08-01 NOTE — PATIENT INSTRUCTIONS
Patient Education   Personalized Prevention Plan  You are due for the preventive services outlined below.  Your care team is available to assist you in scheduling these services.  If you have already completed any of these items, please share that information with your care team to update in your medical record.  Health Maintenance Due   Topic Date Due     Osteoporosis Screening  Never done     Colorectal Cancer Screening  Never done     Mammogram  11/19/2016     Depression Assessment  07/26/2017     Zoster (Shingles) Vaccine (2 of 2) 03/24/2020     Diptheria Tetanus Pertussis (DTAP/TDAP/TD) Vaccine (3 - Td or Tdap) 08/31/2020     Pneumococcal Vaccine (2 - PPSV23 if available, else PCV20) 11/21/2020     COVID-19 Vaccine (5 - Pfizer series) 06/07/2022     A1C Lab  01/25/2023

## 2023-08-01 NOTE — PROGRESS NOTES
"SUBJECTIVE:   Kimberly is a 72 year old who presents for Preventive Visit.      8/1/2023     3:10 PM   Additional Questions   Roomed by Tom   Accompanied by Self       Are you in the first 12 months of your Medicare coverage?  No    Healthy Habits:     In general, how would you rate your overall health?  Fair    Do you usually eat at least 4 servings of fruit and vegetables a day, include whole grains    & fiber and avoid regularly eating high fat or \"junk\" foods?  Yes    Taking medications regularly:  Yes    Medication side effects:  Lightheadedness    Ability to successfully perform activities of daily living:  No assistance needed    Home Safety:  No safety concerns identified    Hearing Impairment:  No hearing concerns    In the past 6 months, have you been bothered by leaking of urine? Yes    In general, how would you rate your overall mental or emotional health?  Good    Additional concerns today:  No        Have you ever done Advance Care Planning? (For example, a Health Directive, POLST, or a discussion with a medical provider or your loved ones about your wishes): No, advance care planning information given to patient to review.  Advanced care planning was discussed at today's visit.       Fall risk  Fallen 2 or more times in the past year?: No  Any fall with injury in the past year?: No    Cognitive Screening   1) Repeat 3 items (Leader, Season, Table)    2) Clock draw: NORMAL  3) 3 item recall: Recalls 3 objects  Results: 3 items recalled: COGNITIVE IMPAIRMENT LESS LIKELY    Mini-CogTM Copyright S [place]. Licensed by the author for use in [facility]; reprinted with permission (caro@.[facility]). All rights reserved.      Do you have sleep apnea, excessive snoring or daytime drowsiness? : no    Reviewed and updated as needed this visit by clinical staff   Tobacco  Allergies  Meds  Problems  Med Hx  Surg Hx  Fam Hx          Reviewed and updated as needed this visit by Provider   Tobacco  " Allergies  Meds  Problems  Med Hx  Surg Hx  Fam Hx         Social History     Tobacco Use    Smoking status: Never    Smokeless tobacco: Never   Substance Use Topics    Alcohol use: No             8/1/2023     3:19 PM   Alcohol Use   Prescreen: >3 drinks/day or >7 drinks/week? Not Applicable   no  Do you have a current opioid prescription? No  Do you use any other controlled substances or medications that are not prescribed by a provider? None and medical cannabis      Current providers sharing in care for this patient include:    Patient Care Team:  Corey Brooks MD as PCP - General (Family Practice)  BONILLA Henderson MD as MD (Dermatology)  Corey Brooks MD as Assigned PCP  Ashlee Carvalho RPH as Pharmacist (Pharmacist)    The following health maintenance items are reviewed in Epic and correct as of today:  Health Maintenance   Topic Date Due    PHQ-9  07/26/2017    ZOSTER IMMUNIZATION (2 of 2) 03/24/2020    DTAP/TDAP/TD IMMUNIZATION (3 - Td or Tdap) 08/31/2020    Pneumococcal Vaccine: 65+ Years (2 - PPSV23 if available, else PCV20) 11/21/2020    COVID-19 Vaccine (5 - Pfizer series) 06/07/2022    A1C  01/25/2023    DEXA  08/14/2024 (Originally 1950)    COLORECTAL CANCER SCREENING  08/14/2024 (Originally 1950)    MAMMO SCREENING  08/15/2024 (Originally 11/19/2016)    INFLUENZA VACCINE (1) 09/01/2023    MEDICARE ANNUAL WELLNESS VISIT  08/01/2024    FALL RISK ASSESSMENT  08/01/2024    LIPID  01/25/2027    ADVANCE CARE PLANNING  08/01/2028    HEPATITIS C SCREENING  Completed    PHQ-2 (once per calendar year)  Completed    IPV IMMUNIZATION  Aged Out    MENINGITIS IMMUNIZATION  Aged Out         Review of Systems   Constitutional:  Negative for chills and fever.   HENT:  Negative for congestion, ear pain, hearing loss and sore throat.    Eyes:  Negative for pain and visual disturbance.   Respiratory:  Positive for shortness of breath. Negative for cough.    Cardiovascular:  Positive for chest  "pain. Negative for palpitations and peripheral edema.   Gastrointestinal:  Negative for abdominal pain, constipation, diarrhea, heartburn, hematochezia and nausea.   Breasts:  Negative for tenderness, breast mass and discharge.   Genitourinary:  Positive for genital sores and urgency. Negative for dysuria, frequency, hematuria, pelvic pain, vaginal bleeding and vaginal discharge.   Musculoskeletal:  Positive for arthralgias. Negative for joint swelling and myalgias.   Skin:  Negative for rash.   Neurological:  Positive for dizziness, weakness and headaches. Negative for paresthesias.   Psychiatric/Behavioral:  Negative for mood changes. The patient is not nervous/anxious.          OBJECTIVE:   /79   Pulse 89   Resp 18   Ht 1.6 m (5' 3\")   Wt 76.7 kg (169 lb 1.6 oz)   SpO2 96%   Breastfeeding No   BMI 29.95 kg/m   Estimated body mass index is 29.95 kg/m  as calculated from the following:    Height as of this encounter: 1.6 m (5' 3\").    Weight as of this encounter: 76.7 kg (169 lb 1.6 oz).  Physical Exam  Vitals reviewed.   Constitutional:       General: She is not in acute distress.     Appearance: She is well-developed. She is not diaphoretic.   HENT:      Head: Normocephalic.   Eyes:      General: No scleral icterus.     Conjunctiva/sclera: Conjunctivae normal.   Neck:      Thyroid: No thyromegaly.   Cardiovascular:      Rate and Rhythm: Normal rate and regular rhythm.      Heart sounds: Normal heart sounds. No murmur heard.  Pulmonary:      Effort: Pulmonary effort is normal. No respiratory distress.      Breath sounds: Normal breath sounds. No wheezing.   Musculoskeletal:      Cervical back: Normal range of motion.      Left lower leg: No edema.   Lymphadenopathy:      Cervical: No cervical adenopathy.   Skin:     General: Skin is warm and dry.   Neurological:      Mental Status: She is alert and oriented to person, place, and time.   Psychiatric:         Behavior: Behavior normal.         Thought " Content: Thought content normal.         Judgment: Judgment normal.               ASSESSMENT / PLAN:       ICD-10-CM    1. Screen for colon cancer  Z12.11       2. Encounter for Medicare annual wellness exam  Z00.00 Hepatitis C RNA, Quantitative by PCR with Confirmatory Reflex to Genotyping      3. Need for diphtheria-tetanus-pertussis (Tdap) vaccine  Z23       4. Visit for screening mammogram  Z12.31       5. Need for prophylactic vaccination with combined diphtheria-tetanus-pertussis (DTP) vaccine  Z23 Pneumococcal 20 Valent Conjugate (PCV20)     Tdap, tetanus-diptheria-acell pertussis, (BOOSTRIX) 5-2.5-18.5 LF-MCG/0.5 DINA injection      6. Hyperglycemia  R73.9 Hemoglobin A1c      7. Benign essential hypertension  I10 amLODIPine (NORVASC) 5 MG tablet      8. 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 Itching  L29.9 hydrOXYzine (ATARAX) 25 MG tablet                COUNSELING:  Reviewed preventive health counseling, as reflected in patient instructions       Regular exercise       Healthy diet/nutrition    Declined dexa, mammogram and colonoscopy    She reports that she has never smoked. She has never used smokeless tobacco.      Appropriate preventive services were discussed with this patient, including applicable screening as appropriate for cardiovascular disease, diabetes, osteopenia/osteoporosis, and glaucoma.  As appropriate for age/gender, discussed screening for colorectal cancer, prostate cancer, breast cancer, and cervical cancer. Checklist reviewing preventive services available has been given to the patient.    Reviewed patients plan of care and provided an AVS. The Intermediate Care Plan ( asthma action plan, low back pain action plan, and migraine action plan) for Kimberly meets the Care Plan requirement.  This Care Plan has been established and reviewed with the Patient.          Corey Brooks MD  [facility]    Identified Health Risks:  I have reviewed Opioid Use Disorder and Substance Use Disorder risk factors and made any needed referrals.

## 2023-08-01 NOTE — LETTER
August 1, 2023      Kimberly Laguna  [address]  [place] [address]        Dear Kimberly,    Thank you for getting your care at [facility]. Please see below for your test results.  Your A1c is slightly elevated.  You do not need treatment at this point we will just plan to recheck it in 1 year    Resulted Orders   Hemoglobin A1c   Result Value Ref Range    Hemoglobin A1C 5.8 (H) 0.0 - 5.6 %      Comment:      Normal <5.7%   Prediabetes 5.7-6.4%    Diabetes 6.5% or higher     Note: Adopted from ADA consensus guidelines.       If you have any concerns about these results please call and leave a message for me or send a Publ[employer] message to the clinic.    Sincerely,    Corey Brooks MD

## 2023-08-03 LAB — HCV RNA SERPL NAA+PROBE-ACNC: NOT DETECTED IU/ML

## 2023-08-04 ENCOUNTER — DOCUMENTATION ONLY (OUTPATIENT)
Dept: FAMILY MEDICINE | Facility: CLINIC | Age: 73
End: 2023-08-04
Payer: MEDICARE

## 2023-08-04 NOTE — PROGRESS NOTES
"When opening a documentation only encounter, be sure to enter in \"Chief Complaint\" Forms and in \" Comments\" Title of form, description if needed.    Kimberly is a 72 year old  female  Form received via: Fax  Form now resides in: Provider Ready    PUMA SMITH        Document shredded 8/8/23 per provider request.     PUMA SMITH          "

## 2023-11-24 ENCOUNTER — OFFICE VISIT (OUTPATIENT)
Dept: FAMILY MEDICINE | Facility: CLINIC | Age: 73
End: 2023-11-24
Payer: MEDICARE

## 2023-11-24 VITALS
HEART RATE: 87 BPM | WEIGHT: 170.1 LBS | OXYGEN SATURATION: 98 % | DIASTOLIC BLOOD PRESSURE: 80 MMHG | BODY MASS INDEX: 30.13 KG/M2 | SYSTOLIC BLOOD PRESSURE: 143 MMHG

## 2023-11-24 DIAGNOSIS — Z23 NEED FOR TDAP VACCINATION: ICD-10-CM

## 2023-11-24 DIAGNOSIS — Z29.11 NEED FOR VACCINATION AGAINST RESPIRATORY SYNCYTIAL VIRUS: ICD-10-CM

## 2023-11-24 DIAGNOSIS — F43.10 PTSD (POST-TRAUMATIC STRESS DISORDER): Primary | ICD-10-CM

## 2023-11-24 DIAGNOSIS — L30.8 SPONGIOTIC PSORIASIFORM DERMATITIS: ICD-10-CM

## 2023-11-24 DIAGNOSIS — G89.29 OTHER CHRONIC PAIN: ICD-10-CM

## 2023-11-24 DIAGNOSIS — L29.9 ITCHING: ICD-10-CM

## 2023-11-24 DIAGNOSIS — Z23 NEED FOR PROPHYLACTIC VACCINATION AGAINST HEPATITIS B VIRUS: ICD-10-CM

## 2023-11-24 PROCEDURE — 99214 OFFICE O/P EST MOD 30 MIN: CPT | Performed by: FAMILY MEDICINE

## 2023-11-24 RX ORDER — HYDROXYZINE HYDROCHLORIDE 25 MG/1
75 TABLET, FILM COATED ORAL AT BEDTIME
Qty: 90 TABLET | Refills: 1 | Status: SHIPPED | OUTPATIENT
Start: 2023-11-24 | End: 2023-12-29

## 2023-11-24 ASSESSMENT — ANXIETY QUESTIONNAIRES
3. WORRYING TOO MUCH ABOUT DIFFERENT THINGS: NOT AT ALL
5. BEING SO RESTLESS THAT IT IS HARD TO SIT STILL: NOT AT ALL
7. FEELING AFRAID AS IF SOMETHING AWFUL MIGHT HAPPEN: SEVERAL DAYS
2. NOT BEING ABLE TO STOP OR CONTROL WORRYING: NOT AT ALL
GAD7 TOTAL SCORE: 3
1. FEELING NERVOUS, ANXIOUS, OR ON EDGE: SEVERAL DAYS
6. BECOMING EASILY ANNOYED OR IRRITABLE: SEVERAL DAYS
GAD7 TOTAL SCORE: 3
IF YOU CHECKED OFF ANY PROBLEMS ON THIS QUESTIONNAIRE, HOW DIFFICULT HAVE THESE PROBLEMS MADE IT FOR YOU TO DO YOUR WORK, TAKE CARE OF THINGS AT HOME, OR GET ALONG WITH OTHER PEOPLE: SOMEWHAT DIFFICULT

## 2023-11-24 ASSESSMENT — PATIENT HEALTH QUESTIONNAIRE - PHQ9
SUM OF ALL RESPONSES TO PHQ QUESTIONS 1-9: 3
5. POOR APPETITE OR OVEREATING: NOT AT ALL

## 2023-11-24 NOTE — PROGRESS NOTES
"  Assessment & Plan     PTSD (post-traumatic stress disorder)  Patient has a history of posttraumatic stress disorder that is helped dramatically by her cannabis use and so she was recertified.    Other chronic pain  Patient has some chronic pain which also is helped by her cannabis.    Need for Tdap vaccination  Declined vaccination    Need for prophylactic vaccination against hepatitis B virus  Declined vaccination    Need for vaccination against respiratory syncytial virus  Declined vaccination    Spongiotic psoriasiform dermatitis  Itching  Patient reports that even though she is 72 and the hydroxyzine is on the beers list of medications that may not be helpful she finds that it is extremely helpful for her and her hands become very inflamed when she does not use it.  - hydrOXYzine (ATARAX) 25 MG tablet; Take 3 tablets (75 mg) by mouth at bedtime Ok to refill early               BMI:   Estimated body mass index is 30.13 kg/m  as calculated from the following:    Height as of 8/1/23: 1.6 m (5' 3\").    Weight as of this encounter: 77.2 kg (170 lb 1.6 oz).           Return in about 1 year (around 11/24/2024) for Cannabis recertification.    Corey Brooks MD  [facility] AVIVA Harris is a 72 year old, presenting for the following health issues:  Other (Referral for medical marijuana, new rx) and Recheck Medication (Can't get rx for hydroxyzine hydrochlorothiazide )        11/24/2023     2:29 PM   Additional Questions   Roomed by Miko WOOD       Kimberly is a 72 year old  who presents for   Patient presents with:  Other: Referral for medical marijuana, new rx  Recheck Medication: Can't get rx for hydroxyzine hydrochlorothiazide       Visit [facility]  1. Follow- up Medical Cannabis Certification  First Certified in 2022 for the Dx of PTSD and chronic Pain. This has been helpful for these conditions for this condition.   What are the benefits of medical cannabis use? Decreased " "anxiety better sleep -\"less shards of glass in brain\"  Any adverse side effect from medical cannabis no  E-mail [email or web address]       I certify that this patient has intractable pain. That is, this patient has pain whose cause cannot be removed and, according to generally accepted medical practice, the full range of pain management modalities appropriate for this patient has been used without adequate result or with intolerable side effects.    Have patient fill out PEG 3 item pain scale form:  1. What number best describes your pain on average in the past week, on a scale from 0 to 10  where 0 is  no pain  and 10 is  pain as bad as you can imagine ?  4  The following two questions ask you to describe how, during the past week, pain has interfered with your life on a  0 to 10  scale, where 0 is  does not interfere at all  and 10 is  completelyinterferes.     2. What number best describes how, during the past week, pain has interfered with your enjoyment of life? [0 to 10]  3  3. What number best describes how, during the past week, pain has interfered with your general activity? [0 to 10]  3  Scoring: The PEG score is the average of the 3 individual item scores. For clinical use, round to the nearest whole number  PEG score  3  Problem, Medication and Allergy Lists were reviewed and updated if needed..  Patient is an established patient of this clinic..               Review of Systems         Objective    BP (!) 143/80   Pulse 87   Wt 77.2 kg (170 lb 1.6 oz)   SpO2 98%   BMI 30.13 kg/m    Body mass index is 30.13 kg/m .  Physical Exam  Vitals and nursing note reviewed.   Constitutional:       General: She is not in acute distress.     Appearance: She is well-developed. She is not diaphoretic.   HENT:      Head: Normocephalic.   Eyes:      General: No scleral icterus.     Conjunctiva/sclera: Conjunctivae normal.   Neck:      Thyroid: No thyromegaly.   Cardiovascular:      Rate and Rhythm: Normal rate and " regular rhythm.      Heart sounds: Normal heart sounds. No murmur heard.  Pulmonary:      Effort: Pulmonary effort is normal. No respiratory distress.      Breath sounds: Normal breath sounds. No wheezing.   Musculoskeletal:      Cervical back: Normal range of motion.      Left lower leg: No edema.   Lymphadenopathy:      Cervical: No cervical adenopathy.   Skin:     General: Skin is warm and dry.   Neurological:      Mental Status: She is alert and oriented to person, place, and time.   Psychiatric:         Behavior: Behavior normal.         Thought Content: Thought content normal.         Judgment: Judgment normal.

## 2023-11-25 NOTE — PATIENT INSTRUCTIONS
Patient Education   Here is the plan from today's visit    1. PTSD (post-traumatic stress disorder)  You were recertified for cannabis they should be contacting you at the email which you gave.    2. Other chronic pain  \    3. Need for Tdap vaccination  \    4. Need for prophylactic vaccination against hepatitis B virus  \    5. Need for vaccination against respiratory syncytial virus  \    6. Spongiotic psoriasiform dermatitis  \  - hydrOXYzine (ATARAX) 25 MG tablet; Take 3 tablets (75 mg) by mouth at bedtime Ok to refill early  Dispense: 90 tablet; Refill: 1    7. Itching   hydrOXYzine (ATARAX) 25 MG tablet; Take 3 tablets (75 mg) by mouth at bedtime Ok to refill early  Dispense: 90 tablet; Refill: 1          Please call or return to clinic if your symptoms don't go away.    Follow up plan  Return in about 1 year (around 11/24/2024) for Cannabis recertification.    Thank you for coming to [facility] today.  Lab Testing:  **If you had lab testing today and your results are reassuring or normal they will be mailed to you or sent through [employer] within 7 days.   **If the lab tests need quick action we will call you with the results.  **If you are having labs done on a different day, please call [phone] to schedule at [facility] or [phone] for other [facility] locations. Labs do not offer walk-in appointments.  The phone number we will call with results is # [phone] (home) . If this is not the best number please call our clinic and change the number.  Medication Refills:  If you need any refills please call your pharmacy and they will contact us.   If you need to  your refill at a new pharmacy, please contact the new pharmacy directly. The new pharmacy will help you get your medications transferred faster.   Scheduling:  If you have any concerns about today's visit or wish to schedule another appointment please call our office during normal business hours [phone]  (8-5:00 M-F). If you can no longer make a scheduled visit, please cancel via [employer] or call us to cancel.   If a referral was made to an [facility] specialty provider and you do not get a call from central scheduling, please refer to directions on your visit summary or call our office during normal business hours for assistance.   If a Mammogram was ordered for you at the Breast Center call [phone] to schedule or change your appointment.  If you had an XRay/CT/Ultrasound/MRI ordered the number is [phone] to schedule or change your radiology appointment.   [facility] has limited ultrasound appointments available on Wednesdays, if you would like your ultrasound at [facility], please call [phone] to schedule.   Medical Concerns:  If you have urgent medical concerns please call [phone] at any time of the day.    Corey Brooks MD

## 2023-12-29 DIAGNOSIS — L29.9 ITCHING: ICD-10-CM

## 2023-12-29 DIAGNOSIS — L30.8 SPONGIOTIC PSORIASIFORM DERMATITIS: ICD-10-CM

## 2023-12-29 NOTE — TELEPHONE ENCOUNTER
"Request for medication refill: hydrOXYzine (ATARAX) 25 MG tablet     Providers if patient needs an appointment and you are willing to give a one month supply please refill for one month and  send a letter/MyChart using \".SMILLIMITEDREFILL\" .smillimited and route chart to \"P [facility] \" (Giving one month refill in non controlled medications is strongly recommended before denial)    If refill has been denied, meaning absolutely no refills without visit, please complete the smart phrase \".smirxrefuse\" and route it to the \"P [facility] MED REFILLS\"  pool to inform the patient and the pharmacy.    PUMA SMITH      "

## 2023-12-30 RX ORDER — HYDROXYZINE HYDROCHLORIDE 25 MG/1
75 TABLET, FILM COATED ORAL AT BEDTIME
Qty: 90 TABLET | Refills: 1 | Status: SHIPPED | OUTPATIENT
Start: 2023-12-30 | End: 2024-02-12

## 2024-02-02 DIAGNOSIS — L29.9 ITCHING: ICD-10-CM

## 2024-02-02 DIAGNOSIS — L30.8 SPONGIOTIC PSORIASIFORM DERMATITIS: ICD-10-CM

## 2024-02-12 RX ORDER — HYDROXYZINE HYDROCHLORIDE 25 MG/1
75 TABLET, FILM COATED ORAL AT BEDTIME
Qty: 270 TABLET | Refills: 1 | Status: SHIPPED | OUTPATIENT
Start: 2024-02-12 | End: 2024-09-09

## 2024-04-28 DIAGNOSIS — I10 BENIGN ESSENTIAL HYPERTENSION: ICD-10-CM

## 2024-04-29 RX ORDER — AMLODIPINE BESYLATE 5 MG/1
5 TABLET ORAL DAILY
Qty: 90 TABLET | Refills: 3 | Status: SHIPPED | OUTPATIENT
Start: 2024-04-29

## 2024-04-29 NOTE — TELEPHONE ENCOUNTER
"Request for medication refill:  amLODIPine (NORVASC) 5 MG tablet     Providers if patient needs an appointment and you are willing to give a one month supply please refill for one month and  send a letter/MyChart using \".SMILLIMITEDREFILL\" .smillimited and route chart to \"P [facility] \" (Giving one month refill in non controlled medications is strongly recommended before denial)    If refill has been denied, meaning absolutely no refills without visit, please complete the smart phrase \".smirxrefuse\" and route it to the \"P [facility] MED REFILLS\"  pool to inform the patient and the pharmacy.    Allegra Sutton, [facility]      "

## 2024-05-01 DIAGNOSIS — Z12.31 ENCOUNTER FOR SCREENING MAMMOGRAM FOR BREAST CANCER: Primary | ICD-10-CM

## 2024-05-16 ENCOUNTER — ANCILLARY PROCEDURE (OUTPATIENT)
Dept: MAMMOGRAPHY | Facility: CLINIC | Age: 74
End: 2024-05-16
Attending: FAMILY MEDICINE
Payer: MEDICARE

## 2024-05-16 DIAGNOSIS — Z12.31 ENCOUNTER FOR SCREENING MAMMOGRAM FOR BREAST CANCER: ICD-10-CM

## 2024-05-16 PROCEDURE — 77063 BREAST TOMOSYNTHESIS BI: CPT | Mod: TC | Performed by: STUDENT IN AN ORGANIZED HEALTH CARE EDUCATION/TRAINING PROGRAM

## 2024-05-16 PROCEDURE — 77067 SCR MAMMO BI INCL CAD: CPT | Mod: TC | Performed by: STUDENT IN AN ORGANIZED HEALTH CARE EDUCATION/TRAINING PROGRAM

## 2024-06-26 ENCOUNTER — TRANSFERRED RECORDS (OUTPATIENT)
Dept: HEALTH INFORMATION MANAGEMENT | Facility: CLINIC | Age: 74
End: 2024-06-26
Payer: MEDICARE

## 2024-08-02 DIAGNOSIS — F31.70 BIPOLAR AFFECTIVE DISORDER IN REMISSION (H): ICD-10-CM

## 2024-08-02 RX ORDER — BUPROPION HYDROCHLORIDE 100 MG/1
200 TABLET, EXTENDED RELEASE ORAL DAILY
Qty: 180 TABLET | Refills: 3 | Status: SHIPPED | OUTPATIENT
Start: 2024-08-02

## 2024-08-02 NOTE — TELEPHONE ENCOUNTER
"Request for medication refill:    buPROPion (WELLBUTRIN SR) 100 MG 12 hr tablet     Providers if patient needs an appointment and you are willing to give a one month supply please refill for one month and  send a letter/MyChart using \".SMILLIMITEDREFILL\" .smillimited and route chart to \"P [facility] \" (Giving one month refill in non controlled medications is strongly recommended before denial)    If refill has been denied, meaning absolutely no refills without visit, please complete the smart phrase \".smirxrefuse\" and route it to the \"P [facility] MED REFILLS\"  pool to inform the patient and the pharmacy.    Mireya Finn MA      "

## 2024-09-07 DIAGNOSIS — L30.8 SPONGIOTIC PSORIASIFORM DERMATITIS: ICD-10-CM

## 2024-09-07 DIAGNOSIS — L29.9 ITCHING: ICD-10-CM

## 2024-09-09 RX ORDER — HYDROXYZINE HYDROCHLORIDE 25 MG/1
75 TABLET, FILM COATED ORAL AT BEDTIME
Qty: 270 TABLET | Refills: 1 | Status: SHIPPED | OUTPATIENT
Start: 2024-09-09

## 2024-09-09 NOTE — TELEPHONE ENCOUNTER
"Request for medication refill:  hydrOXYzine HCl (ATARAX) 25 MG tablet     Providers if patient needs an appointment and you are willing to give a one month supply please refill for one month and  send a letter/MyChart using \".SMILLIMITEDREFILL\" .smillimited and route chart to \"P [facility] \" (Giving one month refill in non controlled medications is strongly recommended before denial)    If refill has been denied, meaning absolutely no refills without visit, please complete the smart phrase \".smirxrefuse\" and route it to the \"P [facility] MED REFILLS\"  pool to inform the patient and the pharmacy.    Allegra Sutton, [facility]      "

## 2024-12-15 DIAGNOSIS — L30.8 SPONGIOTIC PSORIASIFORM DERMATITIS: ICD-10-CM

## 2024-12-15 DIAGNOSIS — L29.9 ITCHING: ICD-10-CM

## 2024-12-16 RX ORDER — HYDROXYZINE HYDROCHLORIDE 25 MG/1
75 TABLET, FILM COATED ORAL AT BEDTIME
Qty: 270 TABLET | Refills: 1 | Status: SHIPPED | OUTPATIENT
Start: 2024-12-16

## 2024-12-16 NOTE — TELEPHONE ENCOUNTER
"Request for medication refill:  hydrOXYzine HCl (ATARAX) 25 MG tablet     Providers if patient needs an appointment and you are willing to give a one month supply please refill for one month and  send a letter/MyChart using \".SMILLIMITEDREFILL\" .smillimited and route chart to \"P [facility] \" (Giving one month refill in non controlled medications is strongly recommended before denial)    If refill has been denied, meaning absolutely no refills without visit, please complete the smart phrase \".smirxrefuse\" and route it to the \"P [facility] MED REFILLS\"  pool to inform the patient and the pharmacy.    Allegra Sutton, [facility]      "

## 2025-05-17 DIAGNOSIS — I10 BENIGN ESSENTIAL HYPERTENSION: ICD-10-CM

## 2025-05-17 NOTE — LETTER
May 19, 2025    Kimberly Laguna  [address]  [place] [address]      Dear Kimberly      A request for a refill on your Amlodipine prescription was sent to us from your pharmacy. I have notified the pharmacy to allow you one more refill.     It is time for your recheck at the clinic so we can monitor the medication and continue to prescribe it safely. Please make clinic appointment with me or another provider at [facility] in the next 1-3 months.   Thank you for choosing us as your healthcare provider.      Sincerely,    Katlyn Campbell MD, on behalf of Dr. Giovanni Brooks

## 2025-05-19 RX ORDER — AMLODIPINE BESYLATE 5 MG/1
5 TABLET ORAL DAILY
Qty: 90 TABLET | Refills: 0 | Status: SHIPPED | OUTPATIENT
Start: 2025-05-19

## 2025-05-19 NOTE — TELEPHONE ENCOUNTER
"Request for medication refill:    Medication Name: amLODIPine (NORVASC) 5 MG tablet     Providers if patient needs an appointment and you are willing to give a one month supply please refill for one month and  send a MyChart using \".SMILLIMITEDREFILL\" .Or route chart to \"P SMI \" . To call patient and inform of limited refill and providers request to make an appointment. (Giving one month refill in non controlled medications is strongly recommended before denial)    If refill has been denied, meaning absolutely no refills without visit, please complete the smart phrase \".SMIRXREFUSE\" and route it to the \"P SMI MED REFILLS\"  pool to inform the patient and the pharmacy.    Allegra Sutton, CMA      "

## 2025-08-18 DIAGNOSIS — E78.2 MIXED HYPERLIPIDEMIA: ICD-10-CM

## 2025-08-18 DIAGNOSIS — R03.0 ELEVATED BLOOD PRESSURE READING WITHOUT DIAGNOSIS OF HYPERTENSION: Primary | ICD-10-CM

## 2025-08-18 DIAGNOSIS — Z13.1 SCREENING FOR DIABETES MELLITUS: ICD-10-CM

## 2025-08-18 DIAGNOSIS — Z12.11 SCREEN FOR COLON CANCER: ICD-10-CM

## 2025-08-18 DIAGNOSIS — I10 BENIGN ESSENTIAL HYPERTENSION: ICD-10-CM

## 2025-08-19 ENCOUNTER — OFFICE VISIT (OUTPATIENT)
Dept: FAMILY MEDICINE | Facility: CLINIC | Age: 75
End: 2025-08-19
Payer: MEDICARE

## 2025-08-19 VITALS
HEART RATE: 91 BPM | OXYGEN SATURATION: 96 % | DIASTOLIC BLOOD PRESSURE: 89 MMHG | RESPIRATION RATE: 16 BRPM | WEIGHT: 164 LBS | BODY MASS INDEX: 29.06 KG/M2 | TEMPERATURE: 98.2 F | SYSTOLIC BLOOD PRESSURE: 151 MMHG | HEIGHT: 63 IN

## 2025-08-19 DIAGNOSIS — R10.9 RIGHT FLANK PAIN: ICD-10-CM

## 2025-08-19 DIAGNOSIS — Z78.0 ASYMPTOMATIC POSTMENOPAUSAL STATUS: ICD-10-CM

## 2025-08-19 DIAGNOSIS — Z13.1 SCREENING FOR DIABETES MELLITUS: Primary | ICD-10-CM

## 2025-08-19 DIAGNOSIS — I10 BENIGN ESSENTIAL HYPERTENSION: ICD-10-CM

## 2025-08-19 DIAGNOSIS — L29.9 ITCHING: ICD-10-CM

## 2025-08-19 DIAGNOSIS — F31.70 BIPOLAR AFFECTIVE DISORDER IN REMISSION: ICD-10-CM

## 2025-08-19 DIAGNOSIS — E78.2 MIXED HYPERLIPIDEMIA: ICD-10-CM

## 2025-08-19 DIAGNOSIS — L30.8 SPONGIOTIC PSORIASIFORM DERMATITIS: ICD-10-CM

## 2025-08-19 LAB
ALBUMIN SERPL BCG-MCNC: 4.1 G/DL (ref 3.5–5.2)
ALP SERPL-CCNC: 83 U/L (ref 40–150)
ALT SERPL W P-5'-P-CCNC: 11 U/L (ref 0–50)
ANION GAP SERPL CALCULATED.3IONS-SCNC: 11 MMOL/L (ref 7–15)
AST SERPL W P-5'-P-CCNC: 21 U/L (ref 0–45)
BILIRUB SERPL-MCNC: 0.2 MG/DL
BUN SERPL-MCNC: 15.1 MG/DL (ref 8–23)
CALCIUM SERPL-MCNC: 9.9 MG/DL (ref 8.8–10.4)
CHLORIDE SERPL-SCNC: 103 MMOL/L (ref 98–107)
CHOLEST SERPL-MCNC: 355 MG/DL
CREAT SERPL-MCNC: 0.95 MG/DL (ref 0.51–0.95)
EGFRCR SERPLBLD CKD-EPI 2021: 63 ML/MIN/1.73M2
EST. AVERAGE GLUCOSE BLD GHB EST-MCNC: 120 MG/DL
FASTING STATUS PATIENT QL REPORTED: NO
FASTING STATUS PATIENT QL REPORTED: NO
GLUCOSE SERPL-MCNC: 112 MG/DL (ref 70–99)
HBA1C MFR BLD: 5.8 % (ref 0–5.6)
HCO3 SERPL-SCNC: 26 MMOL/L (ref 22–29)
HDLC SERPL-MCNC: 43 MG/DL
LDLC SERPL CALC-MCNC: 284 MG/DL
NONHDLC SERPL-MCNC: 312 MG/DL
POTASSIUM SERPL-SCNC: 4.4 MMOL/L (ref 3.4–5.3)
PROT SERPL-MCNC: 7.3 G/DL (ref 6.4–8.3)
SODIUM SERPL-SCNC: 140 MMOL/L (ref 135–145)
TRIGL SERPL-MCNC: 139 MG/DL

## 2025-08-19 RX ORDER — TRIAMCINOLONE ACETONIDE 5 MG/G
CREAM TOPICAL
Qty: 90 G | Refills: 4 | Status: SHIPPED | OUTPATIENT
Start: 2025-08-19

## 2025-08-19 RX ORDER — CAPSAICIN 0.025 %
1 CREAM (GRAM) TOPICAL 3 TIMES DAILY
Qty: 45 G | Refills: 3 | Status: SHIPPED | OUTPATIENT
Start: 2025-08-19

## 2025-08-19 RX ORDER — HYDROXYZINE HYDROCHLORIDE 25 MG/1
75 TABLET, FILM COATED ORAL AT BEDTIME
Qty: 270 TABLET | Refills: 1 | Status: SHIPPED | OUTPATIENT
Start: 2025-08-19

## 2025-08-19 RX ORDER — BUPROPION HYDROCHLORIDE 100 MG/1
300 TABLET, EXTENDED RELEASE ORAL DAILY
Qty: 270 TABLET | Refills: 3 | Status: SHIPPED | OUTPATIENT
Start: 2025-08-19 | End: 2025-08-19

## 2025-08-19 RX ORDER — BUPROPION HYDROCHLORIDE 300 MG/1
300 TABLET ORAL EVERY MORNING
Qty: 90 TABLET | Refills: 1 | Status: SHIPPED | OUTPATIENT
Start: 2025-08-19

## 2025-08-19 RX ORDER — AMLODIPINE BESYLATE 5 MG/1
5 TABLET ORAL DAILY
Qty: 90 TABLET | Refills: 0 | Status: SHIPPED | OUTPATIENT
Start: 2025-08-19 | End: 2025-08-19

## 2025-08-19 RX ORDER — AMLODIPINE BESYLATE 10 MG/1
10 TABLET ORAL DAILY
Qty: 90 TABLET | Refills: 3 | Status: SHIPPED | OUTPATIENT
Start: 2025-08-19

## 2025-08-19 ASSESSMENT — PATIENT HEALTH QUESTIONNAIRE - PHQ9
SUM OF ALL RESPONSES TO PHQ QUESTIONS 1-9: 9
10. IF YOU CHECKED OFF ANY PROBLEMS, HOW DIFFICULT HAVE THESE PROBLEMS MADE IT FOR YOU TO DO YOUR WORK, TAKE CARE OF THINGS AT HOME, OR GET ALONG WITH OTHER PEOPLE: SOMEWHAT DIFFICULT
SUM OF ALL RESPONSES TO PHQ QUESTIONS 1-9: 9

## 2025-08-20 ENCOUNTER — RESULTS FOLLOW-UP (OUTPATIENT)
Dept: FAMILY MEDICINE | Facility: CLINIC | Age: 75
End: 2025-08-20
Payer: MEDICARE

## 2025-08-20 DIAGNOSIS — L29.9 ITCHING: ICD-10-CM

## 2025-08-20 DIAGNOSIS — L30.8 SPONGIOTIC PSORIASIFORM DERMATITIS: ICD-10-CM

## 2025-09-04 RX ORDER — HYDROXYZINE HYDROCHLORIDE 25 MG/1
75 TABLET, FILM COATED ORAL AT BEDTIME
Qty: 270 TABLET | Refills: 1 | OUTPATIENT
Start: 2025-09-04